# Patient Record
Sex: FEMALE | Race: WHITE | NOT HISPANIC OR LATINO | Employment: UNEMPLOYED | RURAL
[De-identification: names, ages, dates, MRNs, and addresses within clinical notes are randomized per-mention and may not be internally consistent; named-entity substitution may affect disease eponyms.]

---

## 2017-03-01 ENCOUNTER — OFFICE VISIT (OUTPATIENT)
Dept: NEUROLOGY | Facility: CLINIC | Age: 57
End: 2017-03-01

## 2017-03-01 VITALS
HEART RATE: 77 BPM | OXYGEN SATURATION: 98 % | BODY MASS INDEX: 35.88 KG/M2 | SYSTOLIC BLOOD PRESSURE: 138 MMHG | HEIGHT: 62 IN | WEIGHT: 195 LBS | DIASTOLIC BLOOD PRESSURE: 89 MMHG

## 2017-03-01 DIAGNOSIS — M54.12 CERVICAL RADICULOPATHY: ICD-10-CM

## 2017-03-01 DIAGNOSIS — G56.03 BILATERAL CARPAL TUNNEL SYNDROME: ICD-10-CM

## 2017-03-01 DIAGNOSIS — G25.0 ESSENTIAL TREMOR: ICD-10-CM

## 2017-03-01 DIAGNOSIS — M54.16 LUMBAR RADICULOPATHY: ICD-10-CM

## 2017-03-01 DIAGNOSIS — M19.011 ARTHROPATHY OF RIGHT SHOULDER: ICD-10-CM

## 2017-03-01 DIAGNOSIS — E11.42 DIABETIC PERIPHERAL NEUROPATHY (HCC): Primary | ICD-10-CM

## 2017-03-01 PROCEDURE — 99214 OFFICE O/P EST MOD 30 MIN: CPT | Performed by: PSYCHIATRY & NEUROLOGY

## 2017-03-01 RX ORDER — ROSUVASTATIN CALCIUM 10 MG/1
TABLET, COATED ORAL
COMMUNITY
Start: 2017-02-27 | End: 2018-08-02

## 2017-03-01 RX ORDER — DOXEPIN HYDROCHLORIDE 10 MG/1
20 CAPSULE ORAL NIGHTLY
Qty: 60 CAPSULE | Refills: 5 | Status: SHIPPED | OUTPATIENT
Start: 2017-03-01 | End: 2017-03-03 | Stop reason: SDUPTHER

## 2017-03-01 RX ORDER — FLUCONAZOLE 150 MG/1
TABLET ORAL
Refills: 0 | COMMUNITY
Start: 2017-02-14 | End: 2018-08-02

## 2017-03-01 RX ORDER — DICYCLOMINE HCL 20 MG
TABLET ORAL
COMMUNITY
Start: 2017-02-27 | End: 2019-12-17

## 2017-03-01 RX ORDER — PRAVASTATIN SODIUM 20 MG
TABLET ORAL
COMMUNITY
Start: 2017-02-27 | End: 2018-08-02

## 2017-03-01 RX ORDER — BUSPIRONE HYDROCHLORIDE 7.5 MG/1
TABLET ORAL
COMMUNITY
Start: 2017-02-13

## 2017-03-01 NOTE — PROGRESS NOTES
Subjective:     Patient ID: Jackelin Ramsey is a 56 y.o. female.    Neck Pain    This is a chronic problem. The current episode started more than 1 year ago. The problem occurs intermittently. The problem has been gradually worsening. Associated symptoms include headaches and numbness. Pertinent negatives include no chest pain, fever, photophobia or weakness.   Back Pain   This is a chronic problem. The current episode started more than 1 year ago. The problem occurs intermittently. The problem has been gradually worsening since onset. Associated symptoms include abdominal pain, headaches, numbness and pelvic pain. Pertinent negatives include no chest pain, dysuria, fever or weakness.   Upper Extremity Issue   This is a chronic problem. The current episode started more than 1 year ago. The problem occurs constantly. The problem has been unchanged. Associated symptoms include abdominal pain, fatigue, headaches, joint swelling (& STIFFNESS), nausea, numbness and a sore throat. Pertinent negatives include no arthralgias, chest pain, chills, coughing, fever, myalgias, neck pain, rash, vomiting or weakness.   Lower Extremity Issue   This is a chronic problem. The current episode started more than 1 year ago. The problem occurs constantly. The problem has been unchanged. Associated symptoms include abdominal pain, fatigue, headaches, joint swelling (& STIFFNESS), nausea, numbness and a sore throat. Pertinent negatives include no arthralgias, chest pain, chills, coughing, fever, myalgias, neck pain, rash, vomiting or weakness.   Muscle Pain   This is a chronic problem. The current episode started more than 1 year ago. The problem occurs constantly. The problem is unchanged. Associated symptoms include abdominal pain, constipation, diarrhea, eye pain, fatigue, headaches, joint swelling (& STIFFNESS), nausea, shortness of breath and wheezing. Pertinent negatives include no chest pain, dysuria, fever, rash, vomiting or  weakness.     The following portions of the patient's history were reviewed and updated as appropriate: allergies, current medications, past family history, past medical history, past social history, past surgical history and problem list.     Patient complains of persistent right shoulder and neck pain, not much better with PT, has intermittent right leg tremor. She had insomnia with prednisone. She has FMS, DM. Complains of leg/foot pain at night. She has had reactions to diabetic meds. Prednisone and meloxicam have helped some. She is intolerant of Lyrica. XRs showed DJD right shoulder, mild DJD c. spine.    Review of Systems   Constitutional: Positive for fatigue and unexpected weight change. Negative for chills and fever.   HENT: Positive for rhinorrhea and sore throat. Negative for ear pain, hearing loss and nosebleeds.    Eyes: Positive for pain and redness. Negative for photophobia, discharge, itching and visual disturbance.        DRY EYES   Respiratory: Positive for shortness of breath and wheezing. Negative for cough and chest tightness.    Cardiovascular: Negative for chest pain, palpitations and leg swelling.   Gastrointestinal: Positive for abdominal pain, constipation, diarrhea and nausea. Negative for blood in stool and vomiting.   Endocrine: Positive for heat intolerance.        MUSCLE WEAKNESS   Genitourinary: Positive for pelvic pain. Negative for dysuria, frequency, hematuria and urgency.   Musculoskeletal: Positive for joint swelling (& STIFFNESS). Negative for arthralgias, back pain, gait problem, myalgias, neck pain and neck stiffness.   Skin: Negative for rash and wound.        ITCHING, DRY SKIN   Allergic/Immunologic: Negative for environmental allergies and food allergies.   Neurological: Positive for numbness and headaches. Negative for dizziness, tremors, seizures, syncope, speech difficulty, weakness and light-headedness.        TINGLING, DIFFICULTY WALKING   Hematological: Negative for  adenopathy. Does not bruise/bleed easily.   Psychiatric/Behavioral: Negative for agitation, confusion, decreased concentration, hallucinations, sleep disturbance and suicidal ideas. The patient is nervous/anxious.         Objective:    Neurologic Exam     Mental Status   Oriented to person, place, and time.     Cranial Nerves     CN III, IV, VI   Pupils are equal, round, and reactive to light.  Extraocular motions are normal.     Motor Exam     Strength   Strength 5/5 throughout.       Physical Exam   Constitutional: She is oriented to person, place, and time. She appears well-developed and well-nourished.   HENT:   Head: Normocephalic and atraumatic.   Eyes: EOM are normal. Pupils are equal, round, and reactive to light.   Neck: Carotid bruit is not present.   Cardiovascular: Normal rate, regular rhythm, normal heart sounds and intact distal pulses.    Pulmonary/Chest: Effort normal and breath sounds normal.   Abdominal: Soft. Bowel sounds are normal.   Musculoskeletal:   Decreased ROM cervical and ls spine, antalgic gait, positive SLR. Decreased ROM right shoulder. Positive Tinel's at both wrists.   Neurological: She is alert and oriented to person, place, and time. She has normal strength. No cranial nerve deficit or sensory deficit. She displays a negative Romberg sign. Coordination and gait normal. She displays no Babinski's sign on the right side. She displays no Babinski's sign on the left side.   Hypoactive DTRs.   Skin: Skin is warm.   Psychiatric: She has a normal mood and affect. Her behavior is normal. Thought content normal. Cognition and memory are normal.       Assessment/Plan:     Jackelin was seen today for bone spurs.    Diagnoses and all orders for this visit:    Diabetic peripheral neuropathy  -     doxepin (SINEquan) 10 MG capsule; Take 2 capsules by mouth Every Night.    Bilateral carpal tunnel syndrome  -      Wrist Hand Orthosis, Wrist Extension Control Cock-up    Arthropathy of right  shoulder  -     Ambulatory Referral to Orthopedic Surgery    Cervical radiculopathy  -     MRI Cervical Spine Without Contrast    Lumbar radiculopathy  -     MRI Lumbar Spine Without Contrast    Essential tremor

## 2017-03-03 DIAGNOSIS — E11.42 DIABETIC PERIPHERAL NEUROPATHY (HCC): ICD-10-CM

## 2017-03-03 RX ORDER — DOXEPIN HYDROCHLORIDE 10 MG/1
20 CAPSULE ORAL NIGHTLY
Qty: 60 CAPSULE | Refills: 5 | Status: SHIPPED | OUTPATIENT
Start: 2017-03-03 | End: 2018-08-02

## 2017-03-07 DIAGNOSIS — M25.511 RIGHT SHOULDER PAIN, UNSPECIFIED CHRONICITY: Primary | ICD-10-CM

## 2017-03-08 ENCOUNTER — OFFICE VISIT (OUTPATIENT)
Dept: ORTHOPEDIC SURGERY | Facility: CLINIC | Age: 57
End: 2017-03-08

## 2017-03-08 VITALS — RESPIRATION RATE: 18 BRPM | WEIGHT: 200 LBS | HEIGHT: 62 IN | BODY MASS INDEX: 36.8 KG/M2

## 2017-03-08 DIAGNOSIS — M25.512 LEFT SHOULDER PAIN, UNSPECIFIED CHRONICITY: Primary | ICD-10-CM

## 2017-03-08 DIAGNOSIS — IMO0002 BURSITIS/TENDONITIS, SHOULDER: ICD-10-CM

## 2017-03-08 DIAGNOSIS — R20.0 BILATERAL HAND NUMBNESS: ICD-10-CM

## 2017-03-08 DIAGNOSIS — M25.511 BILATERAL SHOULDER PAIN, UNSPECIFIED CHRONICITY: ICD-10-CM

## 2017-03-08 DIAGNOSIS — M25.512 BILATERAL SHOULDER PAIN, UNSPECIFIED CHRONICITY: ICD-10-CM

## 2017-03-08 PROCEDURE — 99204 OFFICE O/P NEW MOD 45 MIN: CPT | Performed by: ORTHOPAEDIC SURGERY

## 2017-03-08 RX ORDER — BLOOD SUGAR DIAGNOSTIC
STRIP MISCELLANEOUS
COMMUNITY
Start: 2017-02-24

## 2017-03-08 RX ORDER — CANAGLIFLOZIN 100 MG/1
TABLET, FILM COATED ORAL
COMMUNITY
Start: 2017-02-27 | End: 2018-08-02

## 2017-03-08 RX ORDER — LANCING DEVICE
EACH MISCELLANEOUS
COMMUNITY
Start: 2017-02-24

## 2017-03-08 RX ORDER — SULFAMETHOXAZOLE AND TRIMETHOPRIM 800; 160 MG/1; MG/1
TABLET ORAL
COMMUNITY
Start: 2017-01-23 | End: 2018-08-02

## 2017-03-08 RX ORDER — ISOPROPYL ALCOHOL 0.7 ML/ML
SWAB TOPICAL
COMMUNITY
Start: 2016-12-23

## 2017-03-08 RX ORDER — GLIPIZIDE 10 MG/1
10 TABLET ORAL
Refills: 2 | COMMUNITY
Start: 2017-01-16

## 2017-03-08 NOTE — PROGRESS NOTES
Subjective   Patient ID: Jackelin Ramsey is a 56 y.o. female  Pain and Consult of the Right Shoulder (Patient is here today to be seen at the request of Dr. Farrar. Patient is right hand dominant. Patient also complains of neck and back pain. Patient denies injury.)         History of Present Illness  Patient complains of right worse than left shoulder pain for over 1 month no trauma, has been undergoing therapy with minimal improvement.  Has seen Dr. Mcelroy for chronic neck pain and rheumatoid arthritis.  Underwent a trial treatment with prednisone with minimal improvement in her shoulder symptoms.  Does complain of bilateral hand numbness and tingling dropping things frequently does repetitive work as a .      Review of Systems   Constitutional: Negative for diaphoresis, fever and unexpected weight change.   HENT: Negative for dental problem and sore throat.    Eyes: Negative for visual disturbance.   Respiratory: Negative for shortness of breath.    Cardiovascular: Negative for chest pain.   Gastrointestinal: Negative for abdominal pain, constipation, diarrhea, nausea and vomiting.   Genitourinary: Negative for difficulty urinating and frequency.   Musculoskeletal: Positive for arthralgias.   Neurological: Negative for headaches.   Hematological: Does not bruise/bleed easily.   All other systems reviewed and are negative.      Past Medical History   Diagnosis Date   • Anxiety    • Arthritis    • Asthma    • Bursitis    • Cancer    • Cervical disc disorder    • CTS (carpal tunnel syndrome)    • Diabetes    • Diabetes mellitus    • Diverticulosis    • GERD (gastroesophageal reflux disease)    • Hyperlipidemia    • Hypertension    • Lumbosacral disc disease         Past Surgical History   Procedure Laterality Date   • Dilatation and curettage     • Bladder surgery     • Hysterectomy         Family History   Problem Relation Age of Onset   • Cancer Mother    • Heart disease Mother    • Hypertension Mother   "  • Diabetes Father    • Heart disease Father    • Hypertension Father    • Parkinsonism Maternal Aunt    • Rheum arthritis Other    • Ulcers Other    • Hyperlipidemia Other        Social History     Social History   • Marital status:      Spouse name: N/A   • Number of children: N/A   • Years of education: N/A     Occupational History   • Not on file.     Social History Main Topics   • Smoking status: Never Smoker   • Smokeless tobacco: Not on file   • Alcohol use No   • Drug use: No   • Sexual activity: Defer     Other Topics Concern   • Not on file     Social History Narrative       Allergies   Allergen Reactions   • Farxiga [Dapagliflozin]    • Penicillins    • Percocet [Oxycodone-Acetaminophen]    • Tetracyclines & Related        Objective   Visit Vitals   • Resp 18   • Ht 62\" (157.5 cm)   • Wt 200 lb (90.7 kg)   • BMI 36.58 kg/m2      Physical Exam  Constitutional: He is oriented to person, place, and time. He appears well-developed and well-nourished.   HENT:   Head: Normocephalic and atraumatic.   Eyes: EOM are normal. Pupils are equal, round, and reactive to light.   Neck: Normal range of motion. Neck supple.   Cardiovascular: Normal rate.    Pulmonary/Chest: Effort normal and breath sounds normal.   Abdominal: Soft.   Neurological: He is alert and oriented to person, place, and time.   Skin: Skin is warm and dry.   Psychiatric: He has a normal mood and affect.   Nursing note and vitals reviewed.       Ortho Exam   Right shoulder: Forward flexion 130, abduction 140, internal rotation 30, external rotation 20    Left shoulder: Forward flexion 140, 130 internal rotation, 40 external rotation 30 internal rotation    Assessment/Plan   Review of Radiographic Studies:    Radiographic images today of affected area I personally viewed and showed no sign of acute fracture or dislocation.      Procedures        Physical therapy referral given      Recommendations/Plan:   Work/Activity Status: May perform usual " activities as tolerated      Patient agreeable to call or return sooner for any concerns.             Impression:  Probable bilateral hand carpal tunnel syndrome, bilateral shoulder subacromial bursitis  Plan:  Continue physical therapy for the shoulder regions, formal nerve conduction studies both hands to confirm carpal tunnel syndrome, if not improved next visit consider subacromial steroid injections to both shoulders, discuss surgical release of carpal tunnels when EMG studies complete

## 2017-04-18 ENCOUNTER — PROCEDURE VISIT (OUTPATIENT)
Dept: ORTHOPEDIC SURGERY | Facility: CLINIC | Age: 57
End: 2017-04-18

## 2017-04-18 DIAGNOSIS — G56.03 CARPAL TUNNEL SYNDROME, BILATERAL: ICD-10-CM

## 2017-04-18 DIAGNOSIS — R20.2 PARESTHESIA: ICD-10-CM

## 2017-04-18 PROCEDURE — 95911 NRV CNDJ TEST 9-10 STUDIES: CPT | Performed by: PHYSICAL THERAPIST

## 2017-04-18 PROCEDURE — 95886 MUSC TEST DONE W/N TEST COMP: CPT | Performed by: PHYSICAL THERAPIST

## 2017-04-27 ENCOUNTER — OFFICE VISIT (OUTPATIENT)
Dept: ORTHOPEDIC SURGERY | Facility: CLINIC | Age: 57
End: 2017-04-27

## 2017-04-27 VITALS — WEIGHT: 199.9 LBS | RESPIRATION RATE: 18 BRPM | HEIGHT: 62 IN | BODY MASS INDEX: 36.78 KG/M2

## 2017-04-27 DIAGNOSIS — IMO0002 BURSITIS/TENDONITIS, SHOULDER: Primary | ICD-10-CM

## 2017-04-27 PROCEDURE — 99213 OFFICE O/P EST LOW 20 MIN: CPT | Performed by: ORTHOPAEDIC SURGERY

## 2017-04-27 PROCEDURE — 20610 DRAIN/INJ JOINT/BURSA W/O US: CPT | Performed by: ORTHOPAEDIC SURGERY

## 2017-04-27 RX ORDER — FLUTICASONE PROPIONATE 50 MCG
2 SPRAY, SUSPENSION (ML) NASAL DAILY
Refills: 0 | COMMUNITY
Start: 2017-03-24

## 2017-04-27 RX ORDER — DULAGLUTIDE 0.75 MG/.5ML
INJECTION, SOLUTION SUBCUTANEOUS
Refills: 2 | COMMUNITY
Start: 2017-04-13 | End: 2019-12-17

## 2017-04-27 RX ORDER — METHYLPREDNISOLONE ACETATE 40 MG/ML
40 INJECTION, SUSPENSION INTRA-ARTICULAR; INTRALESIONAL; INTRAMUSCULAR; SOFT TISSUE
Status: COMPLETED | OUTPATIENT
Start: 2017-04-27 | End: 2017-04-27

## 2017-04-27 RX ORDER — LIDOCAINE HYDROCHLORIDE 10 MG/ML
2 INJECTION, SOLUTION INFILTRATION; PERINEURAL
Status: COMPLETED | OUTPATIENT
Start: 2017-04-27 | End: 2017-04-27

## 2017-04-27 RX ORDER — MELOXICAM 15 MG/1
TABLET ORAL
Refills: 0 | COMMUNITY
Start: 2017-03-28 | End: 2018-08-02

## 2017-04-27 RX ADMIN — METHYLPREDNISOLONE ACETATE 40 MG: 40 INJECTION, SUSPENSION INTRA-ARTICULAR; INTRALESIONAL; INTRAMUSCULAR; SOFT TISSUE at 13:27

## 2017-04-27 RX ADMIN — LIDOCAINE HYDROCHLORIDE 2 ML: 10 INJECTION, SOLUTION INFILTRATION; PERINEURAL at 13:27

## 2017-04-27 NOTE — PROGRESS NOTES
Subjective   Patient ID: Jackelin Ramsey is a 56 y.o. female  Pain, Follow-up, and Results of the Right Shoulder (EMG results )             History of Present Illness    EMG studies both upper arms entirely normal including the paracervical musculature, she still complains of anterior right shoulder pain with stiffness especially when reaching up and behind her with her right dominant arm.  She is bothering chronic fibromyalgia rheumatoid arthritis and does take chronic medications for those 2 conditions.  New-onset paresthesias neck pain    Review of Systems   Constitutional: Negative for diaphoresis, fever and unexpected weight change.   HENT: Negative for dental problem and sore throat.    Eyes: Negative for visual disturbance.   Respiratory: Negative for shortness of breath.    Cardiovascular: Negative for chest pain.   Gastrointestinal: Negative for abdominal pain, constipation, diarrhea, nausea and vomiting.   Genitourinary: Negative for difficulty urinating and frequency.   Musculoskeletal: Positive for arthralgias.   Neurological: Negative for headaches.   Hematological: Does not bruise/bleed easily.   All other systems reviewed and are negative.      Past Medical History:   Diagnosis Date   • Anxiety    • Arthritis    • Asthma    • Bursitis    • Cancer    • Cervical disc disorder    • CTS (carpal tunnel syndrome)    • Diabetes    • Diabetes mellitus    • Diverticulosis    • GERD (gastroesophageal reflux disease)    • Hyperlipidemia    • Hypertension    • Lumbosacral disc disease         Past Surgical History:   Procedure Laterality Date   • BLADDER SURGERY     • DILATATION AND CURETTAGE     • HYSTERECTOMY         Family History   Problem Relation Age of Onset   • Cancer Mother    • Heart disease Mother    • Hypertension Mother    • Diabetes Father    • Heart disease Father    • Hypertension Father    • Parkinsonism Maternal Aunt    • Rheum arthritis Other    • Ulcers Other    • Hyperlipidemia Other   "      Social History     Social History   • Marital status:      Spouse name: N/A   • Number of children: N/A   • Years of education: N/A     Occupational History   •       [employer]     Social History Main Topics   • Smoking status: Never Smoker   • Smokeless tobacco: Not on file   • Alcohol use No   • Drug use: No   • Sexual activity: Defer     Other Topics Concern   • Not on file     Social History Narrative       Allergies   Allergen Reactions   • Farxiga [Dapagliflozin]    • Penicillins    • Percocet [Oxycodone-Acetaminophen]    • Tetracyclines & Related        Objective   Resp 18  Ht 62\" (157.5 cm)  Wt 199 lb 14.4 oz (90.7 kg)  BMI 36.56 kg/m2   Physical Exam  Constitutional: He is oriented to person, place, and time. He appears well-developed and well-nourished.   HENT:   Head: Normocephalic and atraumatic.   Eyes: EOM are normal. Pupils are equal, round, and reactive to light.   Neck: Normal range of motion. Neck supple.   Cardiovascular: Normal rate.    Pulmonary/Chest: Effort normal and breath sounds normal.   Abdominal: Soft.   Neurological: He is alert and oriented to person, place, and time.   Skin: Skin is warm and dry.   Psychiatric: He has a normal mood and affect.   Nursing note and vitals reviewed.       Ortho Exam   Right shoulder with positive loss of passive external rotation and positive loss of passive abduction with pain anterolaterally at the right shoulder otherwise preserved strength negative tenderness at the acromial clavicle joint or biceps tendon anteriorly.  Cervical motion limited but does not elicit right posterior shoulder pain    Assessment/Plan   Review of Radiographic Studies:    No new imaging done today.      Large Joint Arthrocentesis  Date/Time: 4/27/2017 1:27 PM  Consent given by: patient  Site marked: site marked  Timeout: Immediately prior to procedure a time out was called to verify the correct patient, procedure, equipment, " support staff and site/side marked as required   Supporting Documentation  Indications: pain   Procedure Details  Location: shoulder - R subacromial bursa  Preparation: Patient was prepped and draped in the usual sterile fashion  Needle size: 22 G  Medications administered: 40 mg methylPREDNISolone acetate 40 MG/ML; 2 mL lidocaine 1 %  Patient tolerance: patient tolerated the procedure well with no immediate complications              Physical therapy referral given      Recommendations/Plan:   Referral: PT/OT 2-3 x wk x 4-6 wks      Patient agreeable to call or return sooner for any concerns.             Impression:  Subacromial bursitis right dominant shoulder with history of fibromyalgia and rheumatoid arthritis recent normal bilateral upper extremity normal nerve conduction studies  Plan:  Refer to therapy for an impingement protocol right shoulder bursitis tendinitis recheck 1 month if not improved consider MRI right shoulder at that time

## 2018-02-08 ENCOUNTER — OFFICE VISIT (OUTPATIENT)
Dept: NEUROLOGY | Facility: CLINIC | Age: 58
End: 2018-02-08

## 2018-02-08 VITALS
BODY MASS INDEX: 35.88 KG/M2 | DIASTOLIC BLOOD PRESSURE: 82 MMHG | SYSTOLIC BLOOD PRESSURE: 125 MMHG | WEIGHT: 195 LBS | HEIGHT: 62 IN

## 2018-02-08 DIAGNOSIS — M79.7 FIBROMYALGIA: ICD-10-CM

## 2018-02-08 DIAGNOSIS — G25.0 ESSENTIAL TREMOR: Primary | ICD-10-CM

## 2018-02-08 DIAGNOSIS — E11.42 DIABETIC PERIPHERAL NEUROPATHY (HCC): ICD-10-CM

## 2018-02-08 PROCEDURE — 99214 OFFICE O/P EST MOD 30 MIN: CPT | Performed by: PSYCHIATRY & NEUROLOGY

## 2018-02-08 NOTE — PROGRESS NOTES
Subjective:     Patient ID: Jackelin Ramsey is a 57 y.o. female.    CC:   Chief Complaint   Patient presents with   • Parkinson's Disease   • Peripheral Neuropathy       HPI:   History of Present Illness  The following portions of the patient's history were reviewed and updated as appropriate: allergies, current medications, past family history, past medical history, past social history, past surgical history and problem list.     Patient has had a shoulder injection with some improvement on gabapentin. Leg pain,neck, low back stable. EMG/NCVs last year of arms were normal. She never completed MRI studies of the spine. She reports a slight hand tremor, generally not disabling.    Past Medical History:   Diagnosis Date   • Anxiety    • Arthritis    • Asthma    • Bursitis    • Cancer    • Cervical disc disorder    • CTS (carpal tunnel syndrome)    • Diabetes    • Diabetes mellitus    • Diverticulosis    • GERD (gastroesophageal reflux disease)    • Hyperlipidemia    • Hypertension    • Lumbosacral disc disease        Past Surgical History:   Procedure Laterality Date   • BLADDER SURGERY     • DILATATION AND CURETTAGE     • HYSTERECTOMY         Social History     Social History   • Marital status:      Spouse name: N/A   • Number of children: N/A   • Years of education: N/A     Occupational History   • [employer]     Social History Main Topics   • Smoking status: Never Smoker   • Smokeless tobacco: Not on file   • Alcohol use No   • Drug use: No   • Sexual activity: Defer     Other Topics Concern   • Not on file     Social History Narrative       Family History   Problem Relation Age of Onset   • Cancer Mother    • Heart disease Mother    • Hypertension Mother    • Diabetes Father    • Heart disease Father    • Hypertension Father    • Parkinsonism Maternal Aunt    • Rheum arthritis Other    • Ulcers Other    • Hyperlipidemia Other         Review of Systems   Constitutional:  Positive for activity change and fatigue. Negative for chills, fever and unexpected weight change.   HENT: Negative for ear pain, hearing loss, nosebleeds, rhinorrhea and sore throat.    Eyes: Positive for photophobia. Negative for pain, discharge, itching and visual disturbance.   Respiratory: Positive for shortness of breath and wheezing. Negative for cough and chest tightness.    Cardiovascular: Negative for chest pain, palpitations and leg swelling.   Gastrointestinal: Positive for abdominal pain, constipation and diarrhea. Negative for blood in stool, nausea and vomiting.   Endocrine: Positive for heat intolerance and polyphagia.   Genitourinary: Negative for dysuria, frequency, hematuria and urgency.   Musculoskeletal: Positive for arthralgias, back pain, joint swelling, myalgias, neck pain and neck stiffness. Negative for gait problem.   Skin: Negative for rash and wound.   Allergic/Immunologic: Negative for environmental allergies and food allergies.   Neurological: Positive for dizziness, tremors, numbness and headaches. Negative for seizures, syncope, speech difficulty, weakness and light-headedness.   Hematological: Negative for adenopathy. Does not bruise/bleed easily.   Psychiatric/Behavioral: Negative for agitation, confusion, decreased concentration, hallucinations, sleep disturbance and suicidal ideas. The patient is nervous/anxious.         Objective:    Neurologic Exam     Mental Status   Oriented to person, place, and time.       Physical Exam   Constitutional: She is oriented to person, place, and time. She appears well-developed and well-nourished.   Cardiovascular: Normal rate and regular rhythm.    Pulmonary/Chest: Effort normal.   Neurological: She is alert and oriented to person, place, and time.   Mild kinetic hand tremor, DTRs hypoactive.   Psychiatric: She has a normal mood and affect. Her behavior is normal. Thought content normal.       Assessment/Plan:       Jackelin was seen today for  parkinson's disease and peripheral neuropathy.    Diagnoses and all orders for this visit:    Essential tremor     -  Avoid caffeine, hypoglycemia.  Diabetic peripheral neuropathy     -  Continue gabapentin.  Fibromyalgia     -  Continue gabapentin.         Surinder Farrar MD  2/19/2018

## 2018-08-02 ENCOUNTER — OFFICE VISIT (OUTPATIENT)
Dept: NEUROLOGY | Facility: CLINIC | Age: 58
End: 2018-08-02

## 2018-08-02 ENCOUNTER — LAB REQUISITION (OUTPATIENT)
Dept: LAB | Facility: HOSPITAL | Age: 58
End: 2018-08-02

## 2018-08-02 VITALS
BODY MASS INDEX: 34.75 KG/M2 | SYSTOLIC BLOOD PRESSURE: 128 MMHG | WEIGHT: 190 LBS | DIASTOLIC BLOOD PRESSURE: 70 MMHG | OXYGEN SATURATION: 99 % | HEART RATE: 67 BPM

## 2018-08-02 DIAGNOSIS — R20.2 FACIAL PARESTHESIA: ICD-10-CM

## 2018-08-02 DIAGNOSIS — G25.0 ESSENTIAL TREMOR: ICD-10-CM

## 2018-08-02 DIAGNOSIS — M79.7 FIBROMYALGIA: ICD-10-CM

## 2018-08-02 DIAGNOSIS — M54.12 CERVICAL RADICULOPATHY: ICD-10-CM

## 2018-08-02 DIAGNOSIS — R25.8 JERKY BODY MOVEMENTS: ICD-10-CM

## 2018-08-02 DIAGNOSIS — Z00.00 ROUTINE GENERAL MEDICAL EXAMINATION AT A HEALTH CARE FACILITY: ICD-10-CM

## 2018-08-02 DIAGNOSIS — E11.42 DIABETIC PERIPHERAL NEUROPATHY (HCC): Primary | ICD-10-CM

## 2018-08-02 DIAGNOSIS — Z79.899 ENCOUNTER FOR DRUG THERAPY: ICD-10-CM

## 2018-08-02 PROCEDURE — 99214 OFFICE O/P EST MOD 30 MIN: CPT | Performed by: NURSE PRACTITIONER

## 2018-08-02 PROCEDURE — 36415 COLL VENOUS BLD VENIPUNCTURE: CPT | Performed by: NURSE PRACTITIONER

## 2018-08-02 RX ORDER — CETIRIZINE HYDROCHLORIDE 10 MG/1
TABLET, FILM COATED ORAL
Refills: 5 | COMMUNITY
Start: 2018-07-13

## 2018-08-02 RX ORDER — PAROXETINE HYDROCHLORIDE 40 MG/1
TABLET, FILM COATED ORAL
Refills: 2 | COMMUNITY
Start: 2018-07-31 | End: 2019-12-17

## 2018-08-02 RX ORDER — ROSUVASTATIN CALCIUM 5 MG/1
TABLET, COATED ORAL
Refills: 2 | COMMUNITY
Start: 2018-07-14 | End: 2023-04-06

## 2018-08-02 RX ORDER — MONTELUKAST SODIUM 10 MG/1
10 TABLET ORAL NIGHTLY
Refills: 5 | COMMUNITY
Start: 2018-07-25

## 2018-08-02 RX ORDER — ALOGLIPTIN 25 MG/1
TABLET, FILM COATED ORAL
COMMUNITY
Start: 2018-07-14 | End: 2019-12-17

## 2018-08-02 RX ORDER — VENLAFAXINE HYDROCHLORIDE 75 MG/1
75 CAPSULE, EXTENDED RELEASE ORAL DAILY
Refills: 2 | COMMUNITY
Start: 2018-07-14

## 2018-08-02 RX ORDER — LOSARTAN POTASSIUM 50 MG/1
TABLET ORAL
Refills: 2 | COMMUNITY
Start: 2018-05-25

## 2018-08-02 NOTE — PROGRESS NOTES
"Subjective:     Patient ID: Jackelin Ramsey is a 57 y.o. female.    CC:   Chief Complaint   Patient presents with   • Gait Problem   • Tremors   • Dizziness   • Numbness       HPI:   History of Present Illness   This is a 58 yo female who presents for 6 month follow up on Diabetic Peripheral Neuropathy present for many years. She normally sees Dr. Farrar Neurology. She is currently prescribed gabapentin 400mg BID and she tells me most days she will take this daily but sometimes she will take it BID. She tells me she tries to minimize her usage d/t being on multiple medications. She is intolerant to Lyrica. Gabapentin has helped moderately with the numbness and tingling in BLE. Also has paresthesias in hands/arms and EMG/NCVS 2017 was normal of BUE. She does need refills on Gabapentin. Also has Fibromyalgia and taking gabapentin for this as well.    She also has Essential Tremors of BUE and occasionally a leg.  She tells me her tremors have been worse and she now has some jerking in her lip and face and also gets some numbness and tingling in her face.  She tells me that her mouth will shake as well.  Her tremor is worse when she is writing or holding an object.  She feels that she shakes most of the time.  Her daughter also has an essential tremor.  She did have a sister who did have Parkinson's disease.  She does have issues with swallowing that her sister and her brother have also had this issue and had to have the esophagus stretched.  She is actually seeing GI Dr. Hernandez is going to have an EGD and a colonoscopy and they're going to evaluate for her needing her esophagus stretched as well.  No issues with smelling. Reports getting labs every 3 months with PCP and has been getting B12 injections. Her daughter is with her and tells me she will be riding in the car as the passenger and her mom's face will twitch and jerk and \"draw up\" and no history of strokes or TIAs and no increased stress or other known " causes. Denies LOC, denies confusion, denies other body jerking, denies urinary or bowel incontinence or biting tongue. It will resolve on its own after several seconds. Is on aspirin and Crestor.    She does have issues with gait but this is related to chronic lower back pain.  She also did have a fall recently where she did slip on wet floor.  She does have chronic neck pain with prior x-ray showing degenerative joint disease and has not improved with physical therapy previously.  She was ordered to have an MRI of the cervical spine in the past and never completed this test.  She feels like she gets radiating pains from her neck into both arms with numbness and tingling and would be willing to complete the MRI.    The following portions of the patient's history were reviewed and updated as appropriate: allergies, current medications, past family history, past medical history, past social history, past surgical history and problem list.    Past Medical History:   Diagnosis Date   • Anxiety    • Arthritis    • Asthma    • Bursitis    • Cancer (CMS/[facility])    • Cervical disc disorder    • CTS (carpal tunnel syndrome)    • Diabetes (CMS/[facility])    • Diabetes mellitus (CMS/[facility])    • Diverticulosis    • GERD (gastroesophageal reflux disease)    • Hyperlipidemia    • Hypertension    • Lumbosacral disc disease        Past Surgical History:   Procedure Laterality Date   • BLADDER SURGERY     • DILATATION AND CURETTAGE     • HYSTERECTOMY         Social History     Social History   • Marital status:      Spouse name: N/A   • Number of children: N/A   • Years of education: N/A     Occupational History   •       [employer]     Social History Main Topics   • Smoking status: Never Smoker   • Smokeless tobacco: Not on file   • Alcohol use No   • Drug use: No   • Sexual activity: Defer     Other Topics Concern   • Not on file     Social History Narrative   • No narrative on file       Family History    Problem Relation Age of Onset   • Cancer Mother    • Heart disease Mother    • Hypertension Mother    • Diabetes Father    • Heart disease Father    • Hypertension Father    • Parkinsonism Maternal Aunt    • Rheum arthritis Other    • Ulcers Other    • Hyperlipidemia Other         Review of Systems   Constitutional: Positive for fatigue. Negative for chills, fever and unexpected weight change.   HENT: Positive for hearing loss, sore throat and voice change. Negative for ear pain, nosebleeds and rhinorrhea.    Eyes: Negative for photophobia, pain, discharge, itching and visual disturbance.   Respiratory: Positive for shortness of breath and wheezing. Negative for cough and chest tightness.    Cardiovascular: Negative for chest pain, palpitations and leg swelling.   Gastrointestinal: Positive for abdominal pain, constipation and diarrhea. Negative for blood in stool, nausea and vomiting.   Genitourinary: Negative for dysuria, frequency, hematuria and urgency.   Musculoskeletal: Positive for arthralgias, back pain, gait problem, myalgias, neck pain and neck stiffness. Negative for joint swelling.   Skin: Positive for rash. Negative for wound.   Allergic/Immunologic: Negative for environmental allergies and food allergies.   Neurological: Positive for dizziness, weakness and numbness. Negative for tremors, seizures, syncope, speech difficulty, light-headedness and headaches.   Hematological: Negative for adenopathy. Does not bruise/bleed easily.   Psychiatric/Behavioral: Positive for sleep disturbance. Negative for agitation, confusion, decreased concentration, hallucinations and suicidal ideas. The patient is nervous/anxious.    All other systems reviewed and are negative.       Objective:    Neurologic Exam     Mental Status   Oriented to person, place, and time.   Speech: speech is normal   Level of consciousness: alert    Cranial Nerves   Cranial nerves II through XII intact.     Motor Exam   Muscle bulk:  normal  Overall muscle tone: normal    Strength   Strength 5/5 except as noted.   Right neck flexion: 4/5  Left neck flexion: 4/5  Right neck extension: 4/5  Left neck extension: 4/5    Sensory Exam   Right arm light touch: decreased from wrist  Left arm light touch: decreased from wrist  Right leg light touch: decreased from ankle  Left leg light touch: decreased from ankle  Right arm vibration: decreased from wrist  Left arm vibration: decreased from wrist  Right leg vibration: decreased from ankle  Left leg vibration: decreased from ankle  Right arm pinprick: decreased from wrist  Left arm pinprick: decreased from wrist  Right leg pinprick: decreased from ankle  Left leg pinprick: decreased from ankle    Gait, Coordination, and Reflexes     Gait  Gait: (antalgic, mild decreased arm swing but has chronic shoulder pain/DJD)    Coordination   Finger to nose coordination: normal    Tremor   Resting tremor: absent  Intention tremor: present (mild BUE kinetic tremor)  Action tremor: absent    Reflexes   Right brachioradialis: 3+  Left brachioradialis: 3+  Right biceps: 3+  Left biceps: 3+  Right triceps: 3+  Left triceps: 3+  Right patellar: 2+  Left patellar: 2+  Right achilles: 2+  Left achilles: 2+  Right : 2+  Left : 2+Normal finger tapping, normal heel tapping bilaterally, able to rise without assistance, no shuffling gait noted.       Physical Exam   Constitutional: She is oriented to person, place, and time.   Neck: Spinous process tenderness and muscular tenderness present. No neck rigidity. Decreased range of motion present. No edema and no erythema present.   Neurological: She is oriented to person, place, and time. She has a normal Finger-Nose-Finger Test.   Reflex Scores:       Tricep reflexes are 3+ on the right side and 3+ on the left side.       Bicep reflexes are 3+ on the right side and 3+ on the left side.       Brachioradialis reflexes are 3+ on the right side and 3+ on the left side.        Patellar reflexes are 2+ on the right side and 2+ on the left side.       Achilles reflexes are 2+ on the right side and 2+ on the left side.  Psychiatric: Her speech is normal and behavior is normal. Judgment and thought content normal. Her mood appears anxious. Cognition and memory are normal.       Assessment/Plan:       Jackelin was seen today for gait problem, tremors, dizziness and numbness.    Diagnoses and all orders for this visit:    Diabetic peripheral neuropathy (CMS/HCC)  -     Drug Screen 11 w/Conf, Serum  -     Gabapentin Level    Essential tremor  Comments:  Gabapentin 400mg BID will be sent to pharmacy if drug screen appropriate. Would avoid additional meds for now d/t polypharmacy.    Cervical radiculopathy  -     MRI Cervical Spine Without Contrast    Jerky body movements  -     MRI Brain Without Contrast  -     EEG Awake or Drowsy Routine    Facial paresthesia  -     MRI Brain Without Contrast    Encounter for drug therapy  -     Drug Screen 11 w/Conf, Serum  -     Gabapentin Level    Fibromyalgia  Comments:  Gabapentin 400mg BID will be sent to pharmacy if drug screen appropriate.         MRI of the cervical spine is requested to rule out cervical spinal stenosis with cervical radiculopathy as well as hyperreflexia on exam.  MRI of the brain without contrast is requested to rule out brain lesion, stroke or TIA with intermittent facial paresthesias as well as jerking body movements. EEG to r/o seizures. Will call with results. F/U in 6 months or sooner if needed. Reviewed medications, potential side effects and signs and symptoms to report. Discussed risk versus benefits of treatment plan with patient and/or family-including medications, labs and radiology that may be ordered. Addressed questions and concerns during visit. Patient and/or family verbalized understanding and agree with plan.    During this visit the following were done:  Labs Reviewed []    Labs Ordered []    Radiology Reports  Reviewed []    Radiology Ordered [x]    PCP Records Reviewed []    Referring Provider Records Reviewed []    ER Records Reviewed []    Hospital Records Reviewed []    History Obtained From Family []    Radiology Images Reviewed []    Other Reviewed []    Records Requested []      As part of this patient's treatment plan I am prescribing controlled substances. The patient has been made aware of appropriate use of such medications, including potential risk of somnolence, limited ability to drive and/or work safely, and potential for dependence or overdose. It has also been made clear that these medications are for use by the patient only, without concomitant use of alcohol or other substances unless prescribed. Keep out of reach of children.  Eliezer report has been reviewed. If this is going to be prescribed long term, [facility] Controlled Substance Agreement Contract has also been read and signed by patient and myself.  Eliezer #[id number] reviewed.  Gabapentin last filled 7/2/18 #60.    EMR Dragon/Transcription Disclaimer:  Much of this encounter note is an electronic transcription of spoken language to printed text. Electronic transcription of spoken language may permit erroneous words or phrases to be inadvertently transcribed. Although I have reviewed the note for such errors, some may still exist in this documentation.      Mamta Joshi, APRN  8/2/2018

## 2018-08-06 LAB
AMPHETAMINES SERPL QL SCN: NEGATIVE NG/ML
BARBITURATES SERPL QL SCN: NEGATIVE UG/ML
BENZODIAZ SERPL QL SCN: NEGATIVE NG/ML
CANNABINOIDS SERPL QL SCN: NEGATIVE NG/ML
COCAINE+BZE SERPL QL SCN: NEGATIVE NG/ML
ETHANOL UR-MCNC: NEGATIVE GM/DL
GABAPENTIN SERPLBLD-MCNC: NORMAL UG/ML (ref 4–16)
METHADONE SERPL QL SCN: NEGATIVE NG/ML
OPIATES SERPL QL SCN: NEGATIVE NG/ML
OXYCODONE+OXYMORPHONE SERPLBLD QL SCN: NEGATIVE NG/ML
PCP SERPL QL SCN: NEGATIVE NG/ML
PROPOXYPH SERPL QL SCN: NEGATIVE NG/ML

## 2018-08-07 ENCOUNTER — TELEPHONE (OUTPATIENT)
Dept: NEUROLOGY | Facility: CLINIC | Age: 58
End: 2018-08-07

## 2018-08-07 NOTE — TELEPHONE ENCOUNTER
----- Message from Frannie Mak sent at 8/6/2018 10:00 AM EDT -----  Regarding: DR. ATKINS/MEDICATION  PATIENT'S DAUGHTER CALLED, AND WAS CHECKING ON HER MOTHERS LAB, AND IF HER MEDICATION IS GOING TO BE CALLED IN.

## 2018-08-08 ENCOUNTER — TELEPHONE (OUTPATIENT)
Dept: NEUROLOGY | Facility: CLINIC | Age: 58
End: 2018-08-08

## 2018-08-08 NOTE — TELEPHONE ENCOUNTER
----- Message from PIERRE Zee sent at 8/8/2018  2:10 PM EDT -----  Drug screen and gabapentin level are negative but patient was out of gabapentin at follow up appointment on 8/2/18. Is Dr. Farrar ok with approving refills for her? Thanks, PIERRE Wilcox

## 2018-08-08 NOTE — PROGRESS NOTES
Drug screen and gabapentin level are negative but patient was out of gabapentin at follow up appointment on 8/2/18. Is Dr. Farrar ok with approving refills for her? Thanks, PIERRE Wilcox

## 2018-08-20 ENCOUNTER — APPOINTMENT (OUTPATIENT)
Dept: NEUROLOGY | Facility: HOSPITAL | Age: 58
End: 2018-08-20

## 2018-08-20 ENCOUNTER — HOSPITAL ENCOUNTER (OUTPATIENT)
Dept: NEUROLOGY | Facility: HOSPITAL | Age: 58
Discharge: HOME | End: 2018-08-20

## 2018-08-20 ENCOUNTER — TELEPHONE (OUTPATIENT)
Dept: NEUROLOGY | Facility: CLINIC | Age: 58
End: 2018-08-20

## 2018-08-20 ENCOUNTER — HOSPITAL ENCOUNTER (OUTPATIENT)
Dept: MRI IMAGING | Facility: HOSPITAL | Age: 58
Discharge: HOME | End: 2018-08-20
Admitting: NURSE PRACTITIONER

## 2018-08-20 ENCOUNTER — HOSPITAL ENCOUNTER (OUTPATIENT)
Dept: MRI IMAGING | Facility: HOSPITAL | Age: 58
Discharge: HOME | End: 2018-08-20

## 2018-08-20 PROCEDURE — 72141 MRI NECK SPINE W/O DYE: CPT

## 2018-08-20 PROCEDURE — 70551 MRI BRAIN STEM W/O DYE: CPT

## 2018-08-20 PROCEDURE — 95819 EEG AWAKE AND ASLEEP: CPT

## 2018-08-20 NOTE — PROGRESS NOTES
Please notify patient MRI Brain appears normal for her age and medical history. She should continue her aspirin and cholesterol medication daily. Her cervical spine does show significant bulging disc. I am inquiring if she would like a referral to pain management for injections to help with her neck pain and radiating symptoms into her arms. If she does I will place a referral. If she does not improve with pain management we could refer her to neurosurgery. Thanks, PIERRE Wilcox

## 2018-08-20 NOTE — TELEPHONE ENCOUNTER
----- Message from PIERRE Zee sent at 8/20/2018 12:55 PM EDT -----  Please notify patient MRI Brain appears normal for her age and medical history. She should continue her aspirin and cholesterol medication daily. Her cervical spine does show significant bulging disc. I am inquiring if she would like a referral to pain management for injections to help with her neck pain and radiating symptoms into her arms. If she does I will place a referral. If she does not improve with pain management we could refer her to neurosurgery. Thanks, PIRERE Wilcox

## 2018-08-20 NOTE — TELEPHONE ENCOUNTER
Pt is aware of her EEG results and her options for sleep study/seizure specialist.  She acknowledged understanding.

## 2018-08-20 NOTE — PROGRESS NOTES
Please notify patient MRI Brain appears normal for her age and medical history. She should continue her aspirin and cholesterol medication daily. Her cervical spine does show significant bulging disc. I am inquiring if she would like a referral to pain management for injections to help with her neck pain and radiating symptoms into her arms. If she does I will place a referral. If she does not improve with pain management we could refer her to neurosurgery. Thanks, PIERRE Wilcox    FAX MRI BRAIN AND C SPINE TO PCP FOR THEIR REVIEW.

## 2018-08-20 NOTE — TELEPHONE ENCOUNTER
----- Message from PIERRE Zee sent at 8/20/2018  9:42 AM EDT -----  Please notify patient EEG showed no evidence of seizure activity. However, if she continues to have confusion or staring type spells it would be worth referring her to a sleep specialist or seizure specialist for further workup. She can follow up with Dr. Farrar as scheduled. Thanks, PIERRE Wilcox

## 2018-08-20 NOTE — PROGRESS NOTES
Please notify patient EEG showed no evidence of seizure activity. However, if she continues to have confusion or staring type spells it would be worth referring her to a sleep specialist or seizure specialist for further workup. She can follow up with Dr. Farrar as scheduled. Thanks, PIERRE Wilcox

## 2018-08-21 ENCOUNTER — TELEPHONE (OUTPATIENT)
Dept: NEUROLOGY | Facility: CLINIC | Age: 58
End: 2018-08-21

## 2018-08-21 NOTE — TELEPHONE ENCOUNTER
I spoke with pt letting her know the results of her MRI/Cervical were normal except some bulging discs and she could refer her to Pain management for some injections but pt said she would try to tough it out and may end up calling back for the referral to pain medicine.  I also told her to continue taking her aspirin/cholesterol medication.

## 2018-08-21 NOTE — TELEPHONE ENCOUNTER
----- Message from PIERRE Zee sent at 8/20/2018 12:55 PM EDT -----  Please notify patient MRI Brain appears normal for her age and medical history. She should continue her aspirin and cholesterol medication daily. Her cervical spine does show significant bulging disc. I am inquiring if she would like a referral to pain management for injections to help with her neck pain and radiating symptoms into her arms. If she does I will place a referral. If she does not improve with pain management we could refer her to neurosurgery. Thanks, PIERRE Wilcox    FAX MRI BRAIN AND C SPINE TO PCP FOR THEIR REVIEW.

## 2019-02-07 ENCOUNTER — OFFICE VISIT (OUTPATIENT)
Dept: NEUROLOGY | Facility: CLINIC | Age: 59
End: 2019-02-07

## 2019-02-07 VITALS
WEIGHT: 198 LBS | HEART RATE: 74 BPM | HEIGHT: 62 IN | SYSTOLIC BLOOD PRESSURE: 128 MMHG | DIASTOLIC BLOOD PRESSURE: 82 MMHG | OXYGEN SATURATION: 97 % | BODY MASS INDEX: 36.44 KG/M2

## 2019-02-07 DIAGNOSIS — M79.7 FIBROMYALGIA: ICD-10-CM

## 2019-02-07 DIAGNOSIS — M54.12 CERVICAL RADICULOPATHY: ICD-10-CM

## 2019-02-07 DIAGNOSIS — E11.42 DIABETIC PERIPHERAL NEUROPATHY (HCC): ICD-10-CM

## 2019-02-07 DIAGNOSIS — M54.2 NECK PAIN: Primary | ICD-10-CM

## 2019-02-07 PROCEDURE — 99214 OFFICE O/P EST MOD 30 MIN: CPT | Performed by: PSYCHIATRY & NEUROLOGY

## 2019-02-07 RX ORDER — GABAPENTIN 400 MG/1
400 CAPSULE ORAL 2 TIMES DAILY
Qty: 60 CAPSULE | Refills: 5
Start: 2019-02-07

## 2019-02-07 NOTE — PROGRESS NOTES
Subjective:     Patient ID: Jackelin Ramsey is a 58 y.o. female.    CC:   Chief Complaint   Patient presents with   • Tremors     Needs refills on gabapentin       HPI:   History of Present Illness  The following portions of the patient's history were reviewed and updated as appropriate: allergies, current medications, past family history, past medical history, past social history, past surgical history and problem list.     C/O neck pain and spasms radiating toward the shoulders. MRI of cspine showed a moderated disc bulge at C5-6 with stenosis. Leg pain is stable and improved. Tolerating gabapentin. She has been recommended pain management for injections, would like to proceed.    Past Medical History:   Diagnosis Date   • Anxiety    • Arthritis    • Asthma    • Bursitis    • Cancer (CMS/HCC)    • Cervical disc disorder    • CTS (carpal tunnel syndrome)    • Diabetes (CMS/HCC)    • Diabetes mellitus (CMS/HCC)    • Diverticulosis    • GERD (gastroesophageal reflux disease)    • Hyperlipidemia    • Hypertension    • Lumbosacral disc disease        Past Surgical History:   Procedure Laterality Date   • BLADDER SURGERY     • DILATATION AND CURETTAGE     • HYSTERECTOMY         Social History     Socioeconomic History   • Marital status:      Spouse name: Not on file   • Number of children: Not on file   • Years of education: Not on file   • Highest education level: Not on file   Social Needs   • Financial resource strain: Not on file   • Food insecurity - worry: Not on file   • Food insecurity - inability: Not on file   • Transportation needs - medical: Not on file   • Transportation needs - non-medical: Not on file   Occupational History   • Occupation:      Comment: [employer]   Tobacco Use   • Smoking status: Never Smoker   • Smokeless tobacco: Never Used   Substance and Sexual Activity   • Alcohol use: No   • Drug use: No   • Sexual activity: Defer   Other Topics Concern   • Not  on file   Social History Narrative   • Not on file       Family History   Problem Relation Age of Onset   • Cancer Mother    • Heart disease Mother    • Hypertension Mother    • Diabetes Father    • Heart disease Father    • Hypertension Father    • Parkinsonism Maternal Aunt    • Rheum arthritis Other    • Ulcers Other    • Hyperlipidemia Other         Review of Systems   Constitutional: Negative.  Negative for chills, fatigue, fever and unexpected weight change.   HENT: Negative.  Negative for ear pain, hearing loss, nosebleeds, rhinorrhea and sore throat.    Eyes: Negative.  Negative for photophobia, pain, discharge, itching and visual disturbance.   Respiratory: Negative.  Negative for cough, chest tightness, shortness of breath and wheezing.    Cardiovascular: Negative.  Negative for chest pain, palpitations and leg swelling.   Gastrointestinal: Negative.  Negative for abdominal pain, blood in stool, constipation, diarrhea, nausea and vomiting.   Endocrine: Negative.    Genitourinary: Negative.  Negative for dysuria, frequency, hematuria and urgency.   Musculoskeletal: Positive for back pain and neck pain. Negative for arthralgias, gait problem, joint swelling, myalgias and neck stiffness.   Skin: Negative.  Negative for rash and wound.   Allergic/Immunologic: Negative.  Negative for environmental allergies and food allergies.   Neurological: Negative.  Negative for dizziness, tremors, seizures, syncope, speech difficulty, weakness, light-headedness, numbness and headaches.   Hematological: Negative.  Negative for adenopathy. Does not bruise/bleed easily.   Psychiatric/Behavioral: Negative.  Negative for agitation, confusion, decreased concentration, hallucinations, sleep disturbance and suicidal ideas. The patient is not nervous/anxious.         Objective:    Neurologic Exam     Mental Status   Oriented to person, place, and time.       Physical Exam   Constitutional: She is oriented to person, place, and  time. She appears well-developed and well-nourished.   Pulmonary/Chest: Effort normal.   Neurological: She is alert and oriented to person, place, and time.   Psychiatric: She has a normal mood and affect. Her behavior is normal. Thought content normal.       Assessment/Plan:       Jackelin was seen today for tremors.    Diagnoses and all orders for this visit:    Neck pain  -     gabapentin (NEURONTIN) 400 MG capsule; Take 1 capsule by mouth 2 (Two) Times a Day.  -     Ambulatory Referral to Pain Management    Fibromyalgia  -     gabapentin (NEURONTIN) 400 MG capsule; Take 1 capsule by mouth 2 (Two) Times a Day.    Cervical radiculopathy  -     gabapentin (NEURONTIN) 400 MG capsule; Take 1 capsule by mouth 2 (Two) Times a Day.  -     Ambulatory Referral to Pain Management    Diabetic peripheral neuropathy (CMS/HCC)  -     gabapentin (NEURONTIN) 400 MG capsule; Take 1 capsule by mouth 2 (Two) Times a Day.    The patient has read and signed the [facility] Controlled Substance Contract.  I will continue to see patient for regular follow up appointments.  They are well controlled on their medication.  RAUL is updated every 3 months. The patient is aware of the potential for addiction and dependence.         Surinder Farrar MD  2/7/2019

## 2019-03-11 ENCOUNTER — TELEPHONE (OUTPATIENT)
Dept: PAIN MEDICINE | Facility: CLINIC | Age: 59
End: 2019-03-11

## 2019-03-11 PROBLEM — M48.02 SPINAL STENOSIS OF CERVICAL REGION: Status: ACTIVE | Noted: 2019-03-11

## 2019-03-11 PROBLEM — F41.1 GENERALIZED ANXIETY DISORDER: Status: ACTIVE | Noted: 2019-03-11

## 2019-04-18 ENCOUNTER — TELEPHONE (OUTPATIENT)
Dept: PAIN MEDICINE | Facility: CLINIC | Age: 59
End: 2019-04-18

## 2019-04-18 NOTE — TELEPHONE ENCOUNTER
Patient called and left me a message wanting to reschedule the new patient appointment she cancelled back in March.   I returned her call to the number she said ([phone]), but it went straight to voicemail. I left her a message.

## 2019-12-17 ENCOUNTER — CONSULT (OUTPATIENT)
Dept: SLEEP MEDICINE | Facility: HOSPITAL | Age: 59
End: 2019-12-17

## 2019-12-17 VITALS
HEIGHT: 62 IN | HEART RATE: 82 BPM | WEIGHT: 202 LBS | SYSTOLIC BLOOD PRESSURE: 150 MMHG | OXYGEN SATURATION: 95 % | DIASTOLIC BLOOD PRESSURE: 80 MMHG | BODY MASS INDEX: 37.17 KG/M2

## 2019-12-17 DIAGNOSIS — G47.33 OSA ON CPAP: Primary | ICD-10-CM

## 2019-12-17 DIAGNOSIS — E66.9 OBESITY (BMI 30-39.9): ICD-10-CM

## 2019-12-17 DIAGNOSIS — Z99.89 OSA ON CPAP: Primary | ICD-10-CM

## 2019-12-17 PROBLEM — E11.9 TYPE 2 DIABETES MELLITUS (HCC): Status: ACTIVE | Noted: 2019-12-17

## 2019-12-17 PROBLEM — E88.01 AAT (ALPHA-1-ANTITRYPSIN) DEFICIENCY (HCC): Status: ACTIVE | Noted: 2019-12-17

## 2019-12-17 PROBLEM — I10 ESSENTIAL HYPERTENSION: Status: ACTIVE | Noted: 2019-12-17

## 2019-12-17 PROBLEM — J44.9 COPD (CHRONIC OBSTRUCTIVE PULMONARY DISEASE) (HCC): Status: ACTIVE | Noted: 2019-12-17

## 2019-12-17 PROCEDURE — 99243 OFF/OP CNSLTJ NEW/EST LOW 30: CPT | Performed by: INTERNAL MEDICINE

## 2019-12-17 NOTE — PROGRESS NOTES
Jackelin Ramsey is a 59 y.o. female.   Chief Complaint   Patient presents with   • Sleeping Problem       HPI     59 y.o. female seen in consultation at the request of Chintan Toney MD for evaluation of the above.     She has a longstanding history of obstructive sleep apnea.  She was diagnosed originally about 10-12 years ago per her best recollection.  She has been seen most recently by Dr. Pal and has been on auto CPAP therapy.  Her current settings are range of 9-16 cm H2O.  Her most recent study was in September 2017 at which time she had an AHI of 38.    She responds well to CPAP therapy.  She has occasional daytime somnolence on some days but this is more the exception than the rule.    Further details are as follows:    These questions are answered based upon her history prior to CPAP therapy.  On CPAP therapy the majority of these symptoms are resolved.    [place] Scale is: 13/24    Estimated average amount of sleep per night: 6  Number of times she wakes up at night: 2  Difficulty falling back asleep: yes  It usually takes 45 minutes to go to sleep.  She feels sleepy upon waking up: yes  Rotating or night shift work: no    Drowsiness/Sleepiness:  She exhibits the following:  excessive daytime sleepiness  excessive daytime fatigue  falls asleep watching TV  falls asleep during times of the day when she is quiet  difficulty driving due to sleepiness  sleepy even while on vacation  sleepy even when sleep time is increased    Snoring/Breathing:  She exhibits the following:  loud snoring  snores in all sleep positions  awoken with dry mouth  quits breathing at night  awakens with respiratory discomfort  sore throat when waking up in the morning  trouble breathing through nose at night  morning headaches    Reflux:  She describes the following:  takes medication for reflux    Narcolepsy:  She exhibits the following:  sudden episodes of sleep during the day  feeling of paralysis while going to sleep or coming  out of sleep    RLS/PLMs:  She describes the following:  moves or jerks during sleep  discomfort in legs with an urge to move them    Insomnia:  She describes the following:  problems initiating sleep at night  frequent awakenings  bothered by pain at night  restless sleep    Parasomnia:  She exhibits the following:  excessive sweating while sleeping  grinds teeth    Weight:  Weight change in the last year:  gain: 0lbs  loss: 0lbs      The patient's relevant past medical, surgical, family, and social history reviewed and updated in Epic as appropriate.    Current medications are:   Current Outpatient Medications:   •  Alcohol Swabs (PHARMACIST CHOICE ALCOHOL) pads, , Disp: , Rfl:   •  ALL DAY ALLERGY 10 MG tablet, , Disp: , Rfl: 5  •  aspirin 81 MG tablet, Take 81 mg by mouth Daily., Disp: , Rfl:   •  busPIRone (BUSPAR) 7.5 MG tablet, , Disp: , Rfl:   •  Calcium Carbonate 1500 (600 Ca) MG tablet, , Disp: , Rfl: 5  •  Elastic Bandages & Supports (WRIST BRACE DELUXE/LEFT L-XL) misc, , Disp: , Rfl: 0  •  fluticasone (FLONASE) 50 MCG/ACT nasal spray, , Disp: , Rfl: 0  •  gabapentin (NEURONTIN) 400 MG capsule, Take 1 capsule by mouth 2 (Two) Times a Day., Disp: 60 capsule, Rfl: 5  •  glipiZIDE (GLUCOTROL) 10 MG tablet, , Disp: , Rfl: 2  •  lansoprazole (PREVACID) 30 MG capsule, , Disp: , Rfl:   •  losartan (COZAAR) 50 MG tablet, , Disp: , Rfl: 2  •  methocarbamol (ROBAXIN) 750 MG tablet, , Disp: , Rfl: 0  •  metoprolol tartrate (LOPRESSOR) 25 MG tablet, , Disp: , Rfl: 2  •  montelukast (SINGULAIR) 10 MG tablet, , Disp: , Rfl: 5  •  ONETOUCH VERIO test strip, , Disp: , Rfl:   •  PHARMACIST CHOICE LANCETS [facility] [place], , Disp: , Rfl:   •  Probiotic Product (PROBIOTIC DAILY PO), Take  by mouth Daily., Disp: , Rfl:   •  rosuvastatin (CRESTOR) 5 MG tablet, , Disp: , Rfl: 2  •  triamterene-hydrochlorothiazide (DYAZIDE) 37.5-25 MG per capsule, , Disp: , Rfl: 2  •  venlafaxine XR (EFFEXOR-XR) 75 MG 24 hr capsule, , Disp: , Rfl:  "2.    Review of Systems    Review of Systems  ROS documented in patient questionnaire ×14 systems.  Reviewed with patient.  Otherwise negative except as noted in HPI.    Physical Exam    Blood pressure 150/80, pulse 82, height 157.5 cm (62\"), weight 91.6 kg (202 lb), SpO2 95 %. Body mass index is 36.95 kg/m².    Physical Exam   Constitutional: She is oriented to person, place, and time. She appears well-developed and well-nourished.   HENT:   Head: Normocephalic and atraumatic.   Nose: Nose normal.   Mouth/Throat: Oropharynx is clear and moist. No oropharyngeal exudate.   Class II airway   Eyes: Conjunctivae are normal. No scleral icterus.   Neck: No tracheal deviation present. No thyromegaly present.   Cardiovascular: Normal rate and regular rhythm. Exam reveals no gallop and no friction rub.   No murmur heard.  Pulmonary/Chest: Effort normal. No respiratory distress. She has no wheezes. She has no rales.   Musculoskeletal: She exhibits no edema or deformity.   Neurological: She is alert and oriented to person, place, and time.   Skin: Skin is warm and dry. No rash noted.   Psychiatric: She has a normal mood and affect. Her behavior is normal.   Nursing note and vitals reviewed.      ASSESSMENT:    Problem List Items Addressed This Visit        Pulmonary Problems    LORI on CPAP - Primary       Other    Obesity (BMI 30-39.9)          59-year-old female with severe obstructive sleep apnea that appears to be well controlled on auto CPAP at a range of 9-16 cm H2O.  Her sleepiness is mostly resolved.  Her download indicates a normal AHI of 2.  Average CPAP pressure is 10 cm H2O.  Average usage is just under 7 hours per night.    PLAN:    1. No change in auto CPAP settings needed  2. She is interested in trying a nasal mask or nasal pillow mask and I see no reason this could not be done with an average pressure of 10.  3. Encouraged her to increase her sleep time and CPAP time by an hour and work on continued weight " loss.  4. Yearly follow-up is appropriate unless she has new problems prior to her scheduled visit.  She will call in that instance.    I have reviewed the results of my evaluation and impression and discussed my recommendations in detail with the patient.    Level of Risk Low due to:  one stable chronic illnesss    Signed by  Surinder Malhotra MD    December 17, 2019      CC: Chintan Toney MD Santos, Edwin, MD

## 2020-01-20 ENCOUNTER — OFFICE VISIT (OUTPATIENT)
Dept: PULMONOLOGY | Facility: CLINIC | Age: 60
End: 2020-01-20

## 2020-01-20 VITALS
SYSTOLIC BLOOD PRESSURE: 144 MMHG | HEART RATE: 69 BPM | HEIGHT: 62 IN | WEIGHT: 201 LBS | BODY MASS INDEX: 36.99 KG/M2 | RESPIRATION RATE: 16 BRPM | TEMPERATURE: 97.2 F | OXYGEN SATURATION: 97 % | DIASTOLIC BLOOD PRESSURE: 90 MMHG

## 2020-01-20 DIAGNOSIS — J45.909 IDIOPATHIC ASTHMA: Primary | ICD-10-CM

## 2020-01-20 DIAGNOSIS — Z78.9 NON-SMOKER: ICD-10-CM

## 2020-01-20 DIAGNOSIS — Z99.89 OSA ON CPAP: ICD-10-CM

## 2020-01-20 DIAGNOSIS — Z14.8 ALPHA-1-ANTITRYPSIN DEFICIENCY CARRIER: ICD-10-CM

## 2020-01-20 DIAGNOSIS — E66.9 OBESITY, CLASS II, BMI 35-39.9: ICD-10-CM

## 2020-01-20 DIAGNOSIS — G47.33 OSA ON CPAP: ICD-10-CM

## 2020-01-20 PROBLEM — Z87.09 HISTORY OF ASTHMA: Status: ACTIVE | Noted: 2020-01-20

## 2020-01-20 PROCEDURE — 94375 RESPIRATORY FLOW VOLUME LOOP: CPT | Performed by: INTERNAL MEDICINE

## 2020-01-20 PROCEDURE — 81332 SERPINA1 GENE: CPT | Performed by: INTERNAL MEDICINE

## 2020-01-20 PROCEDURE — 94729 DIFFUSING CAPACITY: CPT | Performed by: INTERNAL MEDICINE

## 2020-01-20 PROCEDURE — 82103 ALPHA-1-ANTITRYPSIN TOTAL: CPT | Performed by: INTERNAL MEDICINE

## 2020-01-20 PROCEDURE — 99204 OFFICE O/P NEW MOD 45 MIN: CPT | Performed by: INTERNAL MEDICINE

## 2020-01-20 PROCEDURE — 94726 PLETHYSMOGRAPHY LUNG VOLUMES: CPT | Performed by: INTERNAL MEDICINE

## 2020-01-20 RX ORDER — ROSUVASTATIN CALCIUM 10 MG/1
10 TABLET, COATED ORAL DAILY
COMMUNITY
Start: 2019-12-27

## 2020-01-20 RX ORDER — INSULIN ASPART 100 [IU]/ML
INJECTION, SOLUTION INTRAVENOUS; SUBCUTANEOUS 2 TIMES DAILY
COMMUNITY
Start: 2020-01-09

## 2020-01-20 RX ORDER — IPRATROPIUM BROMIDE 21 UG/1
2 SPRAY, METERED NASAL 2 TIMES DAILY
COMMUNITY
Start: 2019-12-12

## 2020-01-20 RX ORDER — BUSPIRONE HYDROCHLORIDE 15 MG/1
15 TABLET ORAL 2 TIMES DAILY
COMMUNITY
Start: 2020-01-03

## 2020-01-20 RX ORDER — INSULIN GLARGINE 100 [IU]/ML
INJECTION, SOLUTION SUBCUTANEOUS NIGHTLY
COMMUNITY
Start: 2020-01-03 | End: 2023-04-06

## 2020-01-20 RX ORDER — OMEPRAZOLE 40 MG/1
40 CAPSULE, DELAYED RELEASE ORAL DAILY
COMMUNITY
Start: 2020-01-06

## 2020-01-20 RX ORDER — SERTRALINE HYDROCHLORIDE 25 MG/1
25 TABLET, FILM COATED ORAL DAILY
COMMUNITY
Start: 2020-01-14 | End: 2023-04-06

## 2020-01-20 RX ORDER — LOSARTAN POTASSIUM AND HYDROCHLOROTHIAZIDE 12.5; 5 MG/1; MG/1
1 TABLET ORAL DAILY
COMMUNITY
Start: 2020-01-14

## 2020-01-20 RX ORDER — REPAGLINIDE 0.5 MG/1
0.5 TABLET ORAL
COMMUNITY
Start: 2020-01-03

## 2020-01-20 RX ORDER — TRIAMTERENE AND HYDROCHLOROTHIAZIDE 37.5; 25 MG/1; MG/1
TABLET ORAL
COMMUNITY
Start: 2020-01-03

## 2020-01-20 RX ORDER — IBUPROFEN 200 MG
600 CAPSULE ORAL
COMMUNITY
Start: 2020-01-03 | End: 2023-04-06

## 2020-01-20 RX ORDER — LIRAGLUTIDE 6 MG/ML
1.8 INJECTION SUBCUTANEOUS DAILY
COMMUNITY
Start: 2020-01-09

## 2020-01-20 NOTE — PROGRESS NOTES
PULMONARY  NOTE    Chief Complaint     History of alpha-1 antitrypsin carrier status, LORI, history of asthma, non-smoker, reflux, cough    History of Present Illness     59-year-old white female referred to our office for evaluation of chronic lung disease.    She has a history of asthma.  She has been on Brio for many years.  She feels that it helps with the symptoms of dyspnea and wheezing.  She infrequently uses albuterol, approximately once per month.    She has had episodes of acute bronchitis or pneumonia about 1 or twice a year.  She has not been on steroids during an exacerbation due to her history of diabetes.    She has a history of reflux.  She remains on a PPI but despite that continues to have reflux symptoms.  She is not following reflux precautions.    She has a cough that occurs both at night and typically in the morning.  She has minimal sputum production.  She has never had hemoptysis.    She has a history of obstructive sleep apnea and is on CPAP which she uses nightly.    She was screened for alpha-1 antitrypsin deficiency several years ago.  I have a copy of the test result and all that appears to indicate is that she had one abnormal Z allele  No indication of exactly what her genetic make-up was or an alpha-1 antitrypsin level.    Patient Active Problem List   Diagnosis   • Lumbar radiculopathy   • Neck pain   • Essential tremor   • Diabetic peripheral neuropathy (CMS/HCC)   • Bilateral carpal tunnel syndrome   • Arthropathy of right shoulder   • Cervical radiculopathy   • Fibromyalgia   • Encounter for drug therapy   • Spinal stenosis of cervical region   • Generalized anxiety disorder   • Type 2 diabetes mellitus (CMS/HCC)   • Essential hypertension   • LORI on CPAP   • Alpha-1-antitrypsin deficiency carrier (MZ)   • Obesity, Class II, BMI 35-39.9   • History of asthma   • Non-smoker     Allergies   Allergen Reactions   • Flagyl [Metronidazole] Other (See Comments)     Severe weakness   •  Farxiga [Dapagliflozin] Myalgia   • Invokana [Canagliflozin] Myalgia   • Penicillins    • Percocet [Oxycodone-Acetaminophen]    • Tetracyclines & Related        Current Outpatient Medications:   •  Alcohol Swabs (PHARMACIST CHOICE ALCOHOL) pads, , Disp: , Rfl:   •  ALL DAY ALLERGY 10 MG tablet, , Disp: , Rfl: 5  •  aspirin 81 MG tablet, Take 81 mg by mouth Daily., Disp: , Rfl:   •  BREO ELLIPTA 200-25 MCG/INH inhaler, Inhale 1 puff Daily., Disp: , Rfl:   •  busPIRone (BUSPAR) 15 MG tablet, Take 15 mg by mouth 2 (Two) Times a Day., Disp: , Rfl:   •  CALCIUM 600 600 MG tablet, Take 600 mg by mouth., Disp: , Rfl:   •  Elastic Bandages & Supports (WRIST BRACE DELUXE/LEFT L-XL) misc, , Disp: , Rfl: 0  •  fluticasone (FLONASE) 50 MCG/ACT nasal spray, 2 sprays into the nostril(s) as directed by provider Daily., Disp: , Rfl: 0  •  gabapentin (NEURONTIN) 400 MG capsule, Take 1 capsule by mouth 2 (Two) Times a Day., Disp: 60 capsule, Rfl: 5  •  glipiZIDE (GLUCOTROL) 10 MG tablet, Take 10 mg by mouth 2 (Two) Times a Day Before Meals., Disp: , Rfl: 2  •  Insulin Glargine (BASAGLAR KWIKPEN) 100 UNIT/ML injection pen, Inject  under the skin into the appropriate area as directed Every Night., Disp: , Rfl:   •  ipratropium (ATROVENT) 0.03 % nasal spray, 2 sprays into the nostril(s) as directed by provider 2 (Two) Times a Day., Disp: , Rfl:   •  lansoprazole (PREVACID) 30 MG capsule, Take 30 mg by mouth Daily., Disp: , Rfl:   •  losartan-hydrochlorothiazide (HYZAAR) 50-12.5 MG per tablet, Take 1 tablet by mouth Daily., Disp: , Rfl:   •  metFORMIN (GLUCOPHAGE) 500 MG tablet, Take 500 mg by mouth Daily With Breakfast., Disp: , Rfl:   •  methocarbamol (ROBAXIN) 750 MG tablet, Take 750 mg by mouth At Night As Needed., Disp: , Rfl: 0  •  metoprolol tartrate (LOPRESSOR) 25 MG tablet, Take 25 mg by mouth 2 (Two) Times a Day., Disp: , Rfl: 2  •  montelukast (SINGULAIR) 10 MG tablet, Take 10 mg by mouth Every Night., Disp: , Rfl: 5  •   NOVOLOG FLEXPEN 100 UNIT/ML solution pen-injector sc pen, 2 (Two) Times a Day., Disp: , Rfl:   •  omeprazole (priLOSEC) 40 MG capsule, Take 40 mg by mouth Daily., Disp: , Rfl:   •  ONETOUCH VERIO test strip, , Disp: , Rfl:   •  PHARMACIST CHOICE LANCETS misc, , Disp: , Rfl:   •  Probiotic Product (PROBIOTIC DAILY PO), Take  by mouth Daily., Disp: , Rfl:   •  repaglinide (PRANDIN) 0.5 MG tablet, Take 0.5 mg by mouth 3 (Three) Times a Day Before Meals., Disp: , Rfl:   •  rosuvastatin (CRESTOR) 10 MG tablet, Take 10 mg by mouth Daily., Disp: , Rfl:   •  sertraline (ZOLOFT) 25 MG tablet, Take 25 mg by mouth Daily., Disp: , Rfl:   •  triamterene-hydrochlorothiazide (DYAZIDE) 37.5-25 MG per capsule, Take 1 capsule by mouth Daily., Disp: , Rfl: 2  •  venlafaxine XR (EFFEXOR-XR) 75 MG 24 hr capsule, Take 75 mg by mouth Daily., Disp: , Rfl: 2  •  busPIRone (BUSPAR) 7.5 MG tablet, , Disp: , Rfl:   •  Calcium Carbonate 1500 (600 Ca) MG tablet, , Disp: , Rfl: 5  •  losartan (COZAAR) 50 MG tablet, , Disp: , Rfl: 2  •  rosuvastatin (CRESTOR) 5 MG tablet, , Disp: , Rfl: 2  •  triamterene-hydrochlorothiazide (MAXZIDE-25) 37.5-25 MG per tablet, , Disp: , Rfl:   •  VICTOZA 18 MG/3ML solution pen-injector injection, Inject 1.8 mg under the skin into the appropriate area as directed Daily., Disp: , Rfl:   MEDICATION LIST AND ALLERGIES REVIEWED.    Family History   Problem Relation Age of Onset   • Cancer Mother    • Heart disease Mother    • Hypertension Mother    • Diabetes Father    • Heart disease Father    • Hypertension Father    • Parkinsonism Maternal Aunt    • Rheum arthritis Other    • Ulcers Other    • Hyperlipidemia Other      Social History     Tobacco Use   • Smoking status: Never Smoker   • Smokeless tobacco: Never Used   Substance Use Topics   • Alcohol use: No   • Drug use: No     Social History     Social History Narrative   • Not on file     FAMILY AND SOCIAL HISTORY REVIEWED.    Review of Systems  ALSO REFER TO  "SCANNED ROS SHEET FROM SAME DATE.    /90 (BP Location: Left arm, Patient Position: Sitting, Cuff Size: Adult)   Pulse 69   Temp 97.2 °F (36.2 °C)   Resp 16   Ht 157.5 cm (62\")   Wt 91.2 kg (201 lb)   SpO2 97% Comment: room air at rest  BMI 36.76 kg/m²   Physical Exam   Constitutional: She is oriented to person, place, and time. She appears well-developed. No distress.   HENT:   Head: Normocephalic and atraumatic.   Neck: No thyromegaly present.   Cardiovascular: Normal rate, regular rhythm and normal heart sounds.   No murmur heard.  Pulmonary/Chest: Effort normal and breath sounds normal. No stridor.   Abdominal: Soft. Bowel sounds are normal.   Musculoskeletal: Normal range of motion. She exhibits no edema.   Lymphadenopathy:     She has no cervical adenopathy.        Right: No supraclavicular and no epitrochlear adenopathy present.        Left: No supraclavicular and no epitrochlear adenopathy present.   Neurological: She is alert and oriented to person, place, and time.   Skin: Skin is warm and dry. She is not diaphoretic.   Psychiatric: She has a normal mood and affect. Her behavior is normal.   Nursing note and vitals reviewed.      Results     PFTs reveal no airway obstruction, no restriction, and a normal diffusion capacity.    Chest x-ray today reveals no infiltrates or consolidation or effusions.   There is one line of linear atelectasis in the right upper lobe    Problem List       ICD-10-CM ICD-9-CM   1. Asthma J45.909 493.90   2. Non-smoker Z78.9 V49.89   3. Alpha-1-antitrypsin deficiency carrier (MZ) Z14.8 V83.89   4. LORI on CPAP G47.33 327.23    Z99.89 V46.8   5. Obesity, Class II, BMI 35-39.9 E66.9 278.00       Discussion     We reviewed her test results.  Based on her previous screen, she does not have alpha-1 antitrypsin deficiency.  She appears to be a carrier with 1 abnormal allele.  We will confirm this with a blood alpha-1 antitrypsin phenotype and level.  Assuming she is an MZ " carrier with normal level, no further work-up or treatment is necessary.    We discussed the impact of being an alpha-1 carrier on her children.  I recommended genetic testing and counseling for her children.  Her son accompanied her to the office today so we did a buccal smear screen on him.  I gave her a test kit for her daughter.  I gave her literature from the alpha-1 antitrypsin Society as well as referral information to the [facility] genetic counseling center.    She has a history of asthma.  Spirometry is normal today.  She is on asthma medication which she feels helps.  She has infrequent exacerbations and rare use of albuterol.  We will keep her on Brio for the time.    I encourage efforts at weight loss.  Have encouraged continued compliance with CPAP.    We discussed reflux precautions.  Gave her reflux information sheet.    I will plan to see her back in 6 months.    Marco A Hassan MD  Note electronically signed    CC: Chintan Toney MD

## 2020-01-21 LAB — ALPHA1 GLOB MFR UR ELPH: 92 MG/DL (ref 90–200)

## 2020-01-27 LAB
PATHOLOGIST NAME: NORMAL
SERPINA1 GENE MUT ANL BLD/T: NORMAL
SERPINA1 GENE MUT TESTED BLD/T: NORMAL

## 2021-01-06 ENCOUNTER — OFFICE VISIT (OUTPATIENT)
Dept: SLEEP MEDICINE | Facility: HOSPITAL | Age: 61
End: 2021-01-06

## 2021-01-06 VITALS
HEART RATE: 69 BPM | WEIGHT: 206.8 LBS | DIASTOLIC BLOOD PRESSURE: 77 MMHG | BODY MASS INDEX: 38.05 KG/M2 | SYSTOLIC BLOOD PRESSURE: 168 MMHG | OXYGEN SATURATION: 98 % | HEIGHT: 62 IN

## 2021-01-06 DIAGNOSIS — Z99.89 OSA ON CPAP: Primary | ICD-10-CM

## 2021-01-06 DIAGNOSIS — E66.9 OBESITY (BMI 30-39.9): ICD-10-CM

## 2021-01-06 DIAGNOSIS — G47.33 OSA ON CPAP: Primary | ICD-10-CM

## 2021-01-06 PROCEDURE — 99212 OFFICE O/P EST SF 10 MIN: CPT | Performed by: NURSE PRACTITIONER

## 2021-01-06 NOTE — PROGRESS NOTES
Chief Complaint:   Chief Complaint   Patient presents with   • Follow-up       HPI:    Jackelin Ramsey is a 60 y.o. female here for follow-up of sleep apnea.  Patient was last seen 12/17/2019.  Patient states she continues to do well with CPAP therapy.  Patient is sleeping approximately 6 to 8 hours nightly and does feel rested upon awakening.  Patient will go to sleep within 1 hour and does not get up during the night.  Patient has an [place] score of 9/24.  Patient has no concerns or complaints and wishes to continue with CPAP        Current medications are:   Current Outpatient Medications:   •  Alcohol Swabs (PHARMACIST CHOICE ALCOHOL) pads, , Disp: , Rfl:   •  ALL DAY ALLERGY 10 MG tablet, , Disp: , Rfl: 5  •  aspirin 81 MG tablet, Take 81 mg by mouth Daily., Disp: , Rfl:   •  BREO ELLIPTA 200-25 MCG/INH inhaler, Inhale 1 puff Daily., Disp: , Rfl:   •  busPIRone (BUSPAR) 15 MG tablet, Take 15 mg by mouth 2 (Two) Times a Day., Disp: , Rfl:   •  CALCIUM 600 600 MG tablet, Take 600 mg by mouth., Disp: , Rfl:   •  Calcium Carbonate 1500 (600 Ca) MG tablet, , Disp: , Rfl: 5  •  Elastic Bandages & Supports (WRIST BRACE DELUXE/LEFT L-XL) misc, , Disp: , Rfl: 0  •  fluticasone (FLONASE) 50 MCG/ACT nasal spray, 2 sprays into the nostril(s) as directed by provider Daily., Disp: , Rfl: 0  •  gabapentin (NEURONTIN) 400 MG capsule, Take 1 capsule by mouth 2 (Two) Times a Day., Disp: 60 capsule, Rfl: 5  •  glipiZIDE (GLUCOTROL) 10 MG tablet, Take 10 mg by mouth 2 (Two) Times a Day Before Meals., Disp: , Rfl: 2  •  Insulin Glargine (BASAGLAR KWIKPEN) 100 UNIT/ML injection pen, Inject  under the skin into the appropriate area as directed Every Night., Disp: , Rfl:   •  ipratropium (ATROVENT) 0.03 % nasal spray, 2 sprays into the nostril(s) as directed by provider 2 (Two) Times a Day., Disp: , Rfl:   •  losartan-hydrochlorothiazide (HYZAAR) 50-12.5 MG per tablet, Take 1 tablet by mouth Daily., Disp: , Rfl:   •  metFORMIN  (GLUCOPHAGE) 500 MG tablet, Take 500 mg by mouth Daily With Breakfast., Disp: , Rfl:   •  methocarbamol (ROBAXIN) 750 MG tablet, Take 750 mg by mouth At Night As Needed., Disp: , Rfl: 0  •  metoprolol tartrate (LOPRESSOR) 25 MG tablet, Take 25 mg by mouth 2 (Two) Times a Day., Disp: , Rfl: 2  •  montelukast (SINGULAIR) 10 MG tablet, Take 10 mg by mouth Every Night., Disp: , Rfl: 5  •  NOVOLOG FLEXPEN 100 UNIT/ML solution pen-injector sc pen, 2 (Two) Times a Day., Disp: , Rfl:   •  omeprazole (priLOSEC) 40 MG capsule, Take 40 mg by mouth Daily., Disp: , Rfl:   •  ONETOUCH VERIO test strip, , Disp: , Rfl:   •  PHARMACIST CHOICE LANCETS misc, , Disp: , Rfl:   •  Probiotic Product (PROBIOTIC DAILY PO), Take  by mouth Daily., Disp: , Rfl:   •  repaglinide (PRANDIN) 0.5 MG tablet, Take 0.5 mg by mouth 3 (Three) Times a Day Before Meals., Disp: , Rfl:   •  rosuvastatin (CRESTOR) 10 MG tablet, Take 10 mg by mouth Daily., Disp: , Rfl:   •  sertraline (ZOLOFT) 25 MG tablet, Take 25 mg by mouth Daily., Disp: , Rfl:   •  triamterene-hydrochlorothiazide (DYAZIDE) 37.5-25 MG per capsule, Take 1 capsule by mouth Daily., Disp: , Rfl: 2  •  VICTOZA 18 MG/3ML solution pen-injector injection, Inject 1.8 mg under the skin into the appropriate area as directed Daily., Disp: , Rfl:   •  busPIRone (BUSPAR) 7.5 MG tablet, , Disp: , Rfl:   •  lansoprazole (PREVACID) 30 MG capsule, Take 30 mg by mouth Daily., Disp: , Rfl:   •  losartan (COZAAR) 50 MG tablet, , Disp: , Rfl: 2  •  rosuvastatin (CRESTOR) 5 MG tablet, , Disp: , Rfl: 2  •  triamterene-hydrochlorothiazide (MAXZIDE-25) 37.5-25 MG per tablet, , Disp: , Rfl:   •  venlafaxine XR (EFFEXOR-XR) 75 MG 24 hr capsule, Take 75 mg by mouth Daily., Disp: , Rfl: 2.      The patient's relevant past medical, surgical, family and social history were reviewed and updated in Epic as appropriate.       Review of Systems   Eyes: Positive for visual disturbance.   Respiratory: Positive for apnea,  shortness of breath and wheezing.    Gastrointestinal:        Heartburn   Musculoskeletal: Positive for arthralgias, back pain, joint swelling, neck pain and neck stiffness.   Allergic/Immunologic: Positive for environmental allergies.   Neurological: Positive for tremors and numbness.   Psychiatric/Behavioral: Positive for sleep disturbance. The patient is nervous/anxious.    All other systems reviewed and are negative.        Objective:    Physical Exam  Vitals signs reviewed.   Constitutional:       Appearance: Normal appearance. She is obese.   HENT:      Head: Normocephalic and atraumatic.      Mouth/Throat:      Mouth: Mucous membranes are moist.      Pharynx: Oropharynx is clear.      Comments: Class 2 airway  Eyes:      Conjunctiva/sclera: Conjunctivae normal.      Pupils: Pupils are equal, round, and reactive to light.   Cardiovascular:      Rate and Rhythm: Normal rate.   Pulmonary:      Effort: Pulmonary effort is normal.      Breath sounds: Normal breath sounds.   Skin:     General: Skin is dry.      Coloration: Skin is not pale.      Findings: No erythema.   Neurological:      Mental Status: She is alert and oriented to person, place, and time.   Psychiatric:         Mood and Affect: Mood normal.         Behavior: Behavior normal.         Thought Content: Thought content normal.         Judgment: Judgment normal.       30/30 days of use  Greater than 4-hour use 100%  90% pressure 11.6  AHI of 2.8  Settings 9-16    ASSESSMENT/PLAN    Diagnoses and all orders for this visit:    1. LORI on CPAP (Primary)  -     CPAP Therapy    2. Obesity (BMI 30-39.9)            1. Counseled patient regarding multimodal approach with healthy nutrition, healthy sleep, regular physical activity, social activities, counseling, and medications. Encouraged to practice lateral sleep position. Avoid alcohol and sedatives close to bedtime.  2.   Refill supplies x1 year.  Return to clinic 1 year or sooner symptoms warrant.  I have  reviewed the results of my evaluation and impression and discussed my recommendations in detail with the patient.      Signed by  Rosangela Cruz, APRN    January 6, 2021      CC: Chintan Toney MD          No ref. provider found

## 2021-07-07 ENCOUNTER — TELEPHONE (OUTPATIENT)
Dept: SLEEP MEDICINE | Facility: HOSPITAL | Age: 61
End: 2021-07-07

## 2021-07-07 NOTE — TELEPHONE ENCOUNTER
Pt's daughter lvm regarding cpap recall. Called back and pt's  answered. Both have affected cpap machines. Trey told her to stop using the machine. Pt's  wanted to know what to do. Informed pt we cannot tell him what to do and they need to make their decision on their own. Pt's  verbalized understanding and said he would call back when they make a decision.

## 2022-01-06 ENCOUNTER — OFFICE VISIT (OUTPATIENT)
Dept: SLEEP MEDICINE | Facility: HOSPITAL | Age: 62
End: 2022-01-06

## 2022-01-06 VITALS
DIASTOLIC BLOOD PRESSURE: 65 MMHG | BODY MASS INDEX: 38.39 KG/M2 | HEART RATE: 77 BPM | SYSTOLIC BLOOD PRESSURE: 160 MMHG | HEIGHT: 62 IN | WEIGHT: 208.6 LBS | OXYGEN SATURATION: 96 %

## 2022-01-06 DIAGNOSIS — G47.33 OSA ON CPAP: Primary | ICD-10-CM

## 2022-01-06 DIAGNOSIS — Z99.89 OSA ON CPAP: Primary | ICD-10-CM

## 2022-01-06 PROCEDURE — 99213 OFFICE O/P EST LOW 20 MIN: CPT | Performed by: NURSE PRACTITIONER

## 2022-01-06 NOTE — PROGRESS NOTES
Chief Complaint:   Chief Complaint   Patient presents with   • Follow-up       HPI:    Jackelin Ramsey is a 61 y.o. female here for follow-up of sleep apnea.  Patient was last seen 1/6/2021.  Patient states she has not used her CPAP for 8 months due to the Trey recall and she feels awful.  She has only sleeping 5 hours nightly and not rested upon awakening.  With CPAP she will go to sleep within 20 to 30 minutes and now is taking her up to 2 hours.  She does wake up several times during the night and with CPAP usually occasionally once she does have an [place] score of 17/24.  We did discuss today the consequences of untreated sleep apnea and patient says she has no complaints or concerns and will continue use until her new when arrives.  It has been [email or web address].        Current medications are:   Current Outpatient Medications:   •  Alcohol Swabs (PHARMACIST CHOICE ALCOHOL) pads, , Disp: , Rfl:   •  ALL DAY ALLERGY 10 MG tablet, , Disp: , Rfl: 5  •  aspirin 81 MG tablet, Take 81 mg by mouth Daily., Disp: , Rfl:   •  BREO ELLIPTA 200-25 MCG/INH inhaler, Inhale 1 puff Daily., Disp: , Rfl:   •  busPIRone (BUSPAR) 15 MG tablet, Take 15 mg by mouth 2 (Two) Times a Day., Disp: , Rfl:   •  CALCIUM 600 600 MG tablet, Take 600 mg by mouth., Disp: , Rfl:   •  Calcium Carbonate 1500 (600 Ca) MG tablet, , Disp: , Rfl: 5  •  Elastic Bandages & Supports (WRIST BRACE DELUXE/LEFT L-XL) misc, , Disp: , Rfl: 0  •  fluticasone (FLONASE) 50 MCG/ACT nasal spray, 2 sprays into the nostril(s) as directed by provider Daily., Disp: , Rfl: 0  •  gabapentin (NEURONTIN) 400 MG capsule, Take 1 capsule by mouth 2 (Two) Times a Day., Disp: 60 capsule, Rfl: 5  •  glipiZIDE (GLUCOTROL) 10 MG tablet, Take 10 mg by mouth 2 (Two) Times a Day Before Meals., Disp: , Rfl: 2  •  Insulin Glargine (BASAGLAR KWIKPEN) 100 UNIT/ML injection pen, Inject  under the skin into the appropriate area as directed Every Night., Disp: , Rfl:   •   ipratropium (ATROVENT) 0.03 % nasal spray, 2 sprays into the nostril(s) as directed by provider 2 (Two) Times a Day., Disp: , Rfl:   •  lansoprazole (PREVACID) 30 MG capsule, Take 30 mg by mouth Daily., Disp: , Rfl:   •  losartan (COZAAR) 50 MG tablet, , Disp: , Rfl: 2  •  losartan-hydrochlorothiazide (HYZAAR) 50-12.5 MG per tablet, Take 1 tablet by mouth Daily., Disp: , Rfl:   •  metFORMIN (GLUCOPHAGE) 500 MG tablet, Take 500 mg by mouth Daily With Breakfast., Disp: , Rfl:   •  methocarbamol (ROBAXIN) 750 MG tablet, Take 750 mg by mouth At Night As Needed., Disp: , Rfl: 0  •  metoprolol tartrate (LOPRESSOR) 25 MG tablet, Take 25 mg by mouth 2 (Two) Times a Day., Disp: , Rfl: 2  •  montelukast (SINGULAIR) 10 MG tablet, Take 10 mg by mouth Every Night., Disp: , Rfl: 5  •  NOVOLOG FLEXPEN 100 UNIT/ML solution pen-injector sc pen, 2 (Two) Times a Day., Disp: , Rfl:   •  omeprazole (priLOSEC) 40 MG capsule, Take 40 mg by mouth Daily., Disp: , Rfl:   •  ONETOUCH VERIO test strip, , Disp: , Rfl:   •  PHARMACIST CHOICE LANCETS mis, , Disp: , Rfl:   •  Probiotic Product (PROBIOTIC DAILY PO), Take  by mouth Daily., Disp: , Rfl:   •  repaglinide (PRANDIN) 0.5 MG tablet, Take 0.5 mg by mouth 3 (Three) Times a Day Before Meals., Disp: , Rfl:   •  rosuvastatin (CRESTOR) 10 MG tablet, Take 10 mg by mouth Daily., Disp: , Rfl:   •  rosuvastatin (CRESTOR) 5 MG tablet, , Disp: , Rfl: 2  •  sertraline (ZOLOFT) 25 MG tablet, Take 25 mg by mouth Daily., Disp: , Rfl:   •  triamterene-hydrochlorothiazide (DYAZIDE) 37.5-25 MG per capsule, Take 1 capsule by mouth Daily., Disp: , Rfl: 2  •  triamterene-hydrochlorothiazide (MAXZIDE-25) 37.5-25 MG per tablet, , Disp: , Rfl:   •  venlafaxine XR (EFFEXOR-XR) 75 MG 24 hr capsule, Take 75 mg by mouth Daily., Disp: , Rfl: 2  •  VICTOZA 18 MG/3ML solution pen-injector injection, Inject 1.8 mg under the skin into the appropriate area as directed Daily., Disp: , Rfl:   •  busPIRone (BUSPAR) 7.5 MG  tablet, , Disp: , Rfl: .      The patient's relevant past medical, surgical, family and social history were reviewed and updated in Epic as appropriate.       Review of Systems   Eyes: Positive for visual disturbance.   Respiratory: Positive for apnea, shortness of breath and wheezing.    Gastrointestinal:        Heartburn   Musculoskeletal: Positive for arthralgias, back pain, joint swelling, neck pain and neck stiffness.   Allergic/Immunologic: Positive for environmental allergies.   Neurological: Positive for tremors and numbness.   Psychiatric/Behavioral: Positive for sleep disturbance. The patient is nervous/anxious.    All other systems reviewed and are negative.        Objective:    Physical Exam  Constitutional:       Appearance: Normal appearance.   HENT:      Head: Normocephalic and atraumatic.      Mouth/Throat:      Comments: Mallampati 2 anatomy  Cardiovascular:      Rate and Rhythm: Normal rate and regular rhythm.   Pulmonary:      Effort: Pulmonary effort is normal.      Breath sounds: Normal breath sounds.   Skin:     General: Skin is warm and dry.   Neurological:      Mental Status: She is alert.   Psychiatric:         Mood and Affect: Mood normal.         Behavior: Behavior normal.         Thought Content: Thought content normal.         Judgment: Judgment normal.           ASSESSMENT/PLAN    Diagnoses and all orders for this visit:    1. LORI on CPAP (Primary)            1. Counseled patient regarding multimodal approach with healthy nutrition, healthy sleep, regular physical activity, social activities, counseling, and medications. Encouraged to practice lateral sleep position. Avoid alcohol and sedatives close to bedtime.  2. Patient encouraged to restart her CPAP use until her new Trey does arrive I will see her back in 6 months to reassess        I have reviewed the results of my evaluation and impression and discussed my recommendations in detail with the patient.      Signed by  Rosangela ARIAS  Nancy, APRN    January 6, 2022      CC: Chintan Toney MD          No ref. provider found

## 2022-04-07 ENCOUNTER — OFFICE VISIT (OUTPATIENT)
Dept: SLEEP MEDICINE | Facility: HOSPITAL | Age: 62
End: 2022-04-07

## 2022-04-07 VITALS
HEIGHT: 62 IN | HEART RATE: 77 BPM | WEIGHT: 209.4 LBS | DIASTOLIC BLOOD PRESSURE: 73 MMHG | OXYGEN SATURATION: 97 % | BODY MASS INDEX: 38.53 KG/M2 | SYSTOLIC BLOOD PRESSURE: 145 MMHG

## 2022-04-07 DIAGNOSIS — G47.33 OSA ON CPAP: Primary | ICD-10-CM

## 2022-04-07 DIAGNOSIS — Z99.89 OSA ON CPAP: Primary | ICD-10-CM

## 2022-04-07 PROCEDURE — 99213 OFFICE O/P EST LOW 20 MIN: CPT | Performed by: NURSE PRACTITIONER

## 2022-04-07 RX ORDER — ALBUTEROL SULFATE 90 UG/1
AEROSOL, METERED RESPIRATORY (INHALATION)
COMMUNITY
Start: 2022-03-02

## 2022-04-07 RX ORDER — INSULIN LISPRO 100 [IU]/ML
INJECTION, SOLUTION INTRAVENOUS; SUBCUTANEOUS
COMMUNITY
Start: 2022-02-15 | End: 2023-04-06

## 2022-04-07 NOTE — PROGRESS NOTES
Chief Complaint:   Chief Complaint   Patient presents with   • Follow-up       HPI:    Jackelin Ramsey is a 61 y.o. female here for follow-up of sleep apnea.  I last saw patient 1/6/2022 and she had not been using her CPAP as she was worried about the recall.  We did discuss that day the consequences of untreated sleep apnea versus her medical history.  I did strongly encourage patient to continue use.  Patient states she felt much better when using and decided to restart.  Patient states she is doing very well.  She sleeps 6 to 8 hours nightly and does feel rested upon awakening.  She goes to sleep easily and will not get up during the night.  Patient has an [place] score of 12/24.  Patient states she is doing well has no concerns or complaints and wishes to continue CPAP.        Current medications are:   Current Outpatient Medications:   •  albuterol sulfate  (90 Base) MCG/ACT inhaler, , Disp: , Rfl:   •  Alcohol Swabs (PHARMACIST CHOICE ALCOHOL) pads, , Disp: , Rfl:   •  ALL DAY ALLERGY 10 MG tablet, , Disp: , Rfl: 5  •  aspirin 81 MG tablet, Take 81 mg by mouth Daily., Disp: , Rfl:   •  BREO ELLIPTA 200-25 MCG/INH inhaler, Inhale 1 puff Daily., Disp: , Rfl:   •  busPIRone (BUSPAR) 15 MG tablet, Take 15 mg by mouth 2 (Two) Times a Day., Disp: , Rfl:   •  busPIRone (BUSPAR) 7.5 MG tablet, , Disp: , Rfl:   •  CALCIUM 600 600 MG tablet, Take 600 mg by mouth., Disp: , Rfl:   •  Calcium Carbonate 1500 (600 Ca) MG tablet, , Disp: , Rfl: 5  •  Elastic Bandages & Supports (WRIST BRACE DELUXE/LEFT L-XL) misc, , Disp: , Rfl: 0  •  fluticasone (FLONASE) 50 MCG/ACT nasal spray, 2 sprays into the nostril(s) as directed by provider Daily., Disp: , Rfl: 0  •  gabapentin (NEURONTIN) 400 MG capsule, Take 1 capsule by mouth 2 (Two) Times a Day., Disp: 60 capsule, Rfl: 5  •  glipiZIDE (GLUCOTROL) 10 MG tablet, Take 10 mg by mouth 2 (Two) Times a Day Before Meals., Disp: , Rfl: 2  •  HumaLOG KwikPen 100 UNIT/ML solution  pen-injector, INJECT 45 UNITS SUBCUTANEOUS IN THE AM, 45 UNITS AT NOON AND 45 UNITS AT 530PM, Disp: , Rfl:   •  Insulin Glargine (BASAGLAR KWIKPEN) 100 UNIT/ML injection pen, Inject  under the skin into the appropriate area as directed Every Night., Disp: , Rfl:   •  ipratropium (ATROVENT) 0.03 % nasal spray, 2 sprays into the nostril(s) as directed by provider 2 (Two) Times a Day., Disp: , Rfl:   •  lansoprazole (PREVACID) 30 MG capsule, Take 30 mg by mouth Daily., Disp: , Rfl:   •  losartan (COZAAR) 50 MG tablet, , Disp: , Rfl: 2  •  losartan-hydrochlorothiazide (HYZAAR) 50-12.5 MG per tablet, Take 1 tablet by mouth Daily., Disp: , Rfl:   •  metFORMIN (GLUCOPHAGE) 500 MG tablet, Take 500 mg by mouth Daily With Breakfast., Disp: , Rfl:   •  methocarbamol (ROBAXIN) 750 MG tablet, Take 750 mg by mouth At Night As Needed., Disp: , Rfl: 0  •  metoprolol tartrate (LOPRESSOR) 25 MG tablet, Take 25 mg by mouth 2 (Two) Times a Day., Disp: , Rfl: 2  •  montelukast (SINGULAIR) 10 MG tablet, Take 10 mg by mouth Every Night., Disp: , Rfl: 5  •  NOVOLOG FLEXPEN 100 UNIT/ML solution pen-injector sc pen, 2 (Two) Times a Day., Disp: , Rfl:   •  omeprazole (priLOSEC) 40 MG capsule, Take 40 mg by mouth Daily., Disp: , Rfl:   •  ONETOUCH VERIO test strip, , Disp: , Rfl:   •  PHARMACIST CHOICE LANCETS [facility] [place], , Disp: , Rfl:   •  Probiotic Product (PROBIOTIC DAILY PO), Take  by mouth Daily., Disp: , Rfl:   •  repaglinide (PRANDIN) 0.5 MG tablet, Take 0.5 mg by mouth 3 (Three) Times a Day Before Meals., Disp: , Rfl:   •  rosuvastatin (CRESTOR) 10 MG tablet, Take 10 mg by mouth Daily., Disp: , Rfl:   •  rosuvastatin (CRESTOR) 5 MG tablet, , Disp: , Rfl: 2  •  sertraline (ZOLOFT) 25 MG tablet, Take 25 mg by mouth Daily., Disp: , Rfl:   •  triamterene-hydrochlorothiazide (DYAZIDE) 37.5-25 MG per capsule, Take 1 capsule by mouth Daily., Disp: , Rfl: 2  •  triamterene-hydrochlorothiazide (MAXZIDE-25) 37.5-25 MG per tablet, , Disp: , Rfl:   •   "venlafaxine XR (EFFEXOR-XR) 75 MG 24 hr capsule, Take 75 mg by mouth Daily., Disp: , Rfl: 2  •  VICTOZA 18 MG/3ML solution pen-injector injection, Inject 1.8 mg under the skin into the appropriate area as directed Daily., Disp: , Rfl: .      The patient's relevant past medical, surgical, family and social history were reviewed and updated in Epic as appropriate.       Review of Systems   Eyes: Positive for visual disturbance.   Respiratory: Positive for apnea, shortness of breath and wheezing.    Gastrointestinal:        Heartburn   Musculoskeletal: Positive for arthralgias, back pain, myalgias, neck pain and neck stiffness.   Allergic/Immunologic: Positive for environmental allergies.   Neurological: Positive for tremors and numbness.   Psychiatric/Behavioral: Positive for sleep disturbance. The patient is nervous/anxious.    All other systems reviewed and are negative.        Objective:    Physical Exam  Constitutional:       Appearance: Normal appearance.   HENT:      Head: Normocephalic and atraumatic.      Mouth/Throat:      Comments: Class 2 airway  Cardiovascular:      Rate and Rhythm: Normal rate and regular rhythm.   Pulmonary:      Effort: Pulmonary effort is normal.      Breath sounds: Normal breath sounds.   Skin:     General: Skin is warm and dry.   Neurological:      Mental Status: She is alert.   Psychiatric:         Mood and Affect: Mood normal.         Behavior: Behavior normal.         Thought Content: Thought content normal.         Judgment: Judgment normal.     /73   Pulse 77   Ht 157.5 cm (62\")   Wt 95 kg (209 lb 6.4 oz)   SpO2 97%   BMI 38.30 kg/m²         ASSESSMENT/PLAN    Diagnoses and all orders for this visit:    1. LORI on CPAP (Primary)            1. Counseled patient regarding multimodal approach with healthy nutrition, healthy sleep, regular physical activity, social activities, counseling, and medications. Encouraged to practice lateral sleep position. Avoid alcohol and " sedatives close to bedtime.  2. Patient will continue CPAP therapy and is doing very well we will see her back in 1 year or sooner symptoms warrant.    I have reviewed the results of my evaluation and impression and discussed my recommendations in detail with the patient.      Signed by  PIERRE Thapa    April 7, 2022      CC: Chintan Toney MD          No ref. provider found

## 2023-04-06 ENCOUNTER — OFFICE VISIT (OUTPATIENT)
Dept: SLEEP MEDICINE | Facility: HOSPITAL | Age: 63
End: 2023-04-06
Payer: COMMERCIAL

## 2023-04-06 VITALS
BODY MASS INDEX: 40.56 KG/M2 | WEIGHT: 220.4 LBS | DIASTOLIC BLOOD PRESSURE: 62 MMHG | HEART RATE: 72 BPM | SYSTOLIC BLOOD PRESSURE: 133 MMHG | OXYGEN SATURATION: 95 % | HEIGHT: 62 IN

## 2023-04-06 DIAGNOSIS — G47.33 OSA ON CPAP: Primary | ICD-10-CM

## 2023-04-06 DIAGNOSIS — Z99.89 OSA ON CPAP: Primary | ICD-10-CM

## 2023-04-06 PROCEDURE — 99213 OFFICE O/P EST LOW 20 MIN: CPT | Performed by: NURSE PRACTITIONER

## 2023-04-06 RX ORDER — PANTOPRAZOLE SODIUM 40 MG/1
40 TABLET, DELAYED RELEASE ORAL 3 TIMES DAILY
COMMUNITY
Start: 2022-11-14

## 2023-04-06 NOTE — PROGRESS NOTES
Chief Complaint:   Chief Complaint   Patient presents with   • Follow-up       HPI:    Jackelin Ramsey is a 62 y.o. female here for follow-up of sleep apnea.  Patient was last seen 4/7/2022.  Patient states she has received her new Trey machine and is doing very well.  Patient is sleeping 6 to 8 hours nightly and does feel rested upon awakening.  Patient goes to sleep easily and does not get up during the night.  We did discuss patient's [place] score of 15/24.  Patient states she feels that out is how she feels today with a bad night sleep last night and not how she normally feels.  She is usually rested throughout the day.  She has no concerns or complaints regarding CPAP and wishes to continue therapy.        Current medications are:   Current Outpatient Medications:   •  albuterol sulfate  (90 Base) MCG/ACT inhaler, , Disp: , Rfl:   •  Alcohol Swabs (PHARMACIST CHOICE ALCOHOL) pads, , Disp: , Rfl:   •  ALL DAY ALLERGY 10 MG tablet, , Disp: , Rfl: 5  •  aspirin 81 MG tablet, Take 1 tablet by mouth Daily., Disp: , Rfl:   •  busPIRone (BUSPAR) 15 MG tablet, Take 1 tablet by mouth 2 (Two) Times a Day., Disp: , Rfl:   •  Calcium Carbonate 1500 (600 Ca) MG tablet, , Disp: , Rfl: 5  •  Elastic Bandages & Supports (WRIST BRACE DELUXE/LEFT L-XL) misc, , Disp: , Rfl: 0  •  fluticasone (FLONASE) 50 MCG/ACT nasal spray, 2 sprays into the nostril(s) as directed by provider Daily., Disp: , Rfl: 0  •  gabapentin (NEURONTIN) 400 MG capsule, Take 1 capsule by mouth 2 (Two) Times a Day., Disp: 60 capsule, Rfl: 5  •  ipratropium (ATROVENT) 0.03 % nasal spray, 2 sprays into the nostril(s) as directed by provider 2 (Two) Times a Day., Disp: , Rfl:   •  losartan (COZAAR) 50 MG tablet, , Disp: , Rfl: 2  •  losartan-hydrochlorothiazide (HYZAAR) 50-12.5 MG per tablet, Take 1 tablet by mouth Daily., Disp: , Rfl:   •  metFORMIN (GLUCOPHAGE) 500 MG tablet, Take 1 tablet by mouth Daily With Breakfast., Disp: , Rfl:   •   methocarbamol (ROBAXIN) 750 MG tablet, Take 1 tablet by mouth At Night As Needed., Disp: , Rfl: 0  •  metoprolol tartrate (LOPRESSOR) 25 MG tablet, Take 1 tablet by mouth 2 (Two) Times a Day., Disp: , Rfl: 2  •  montelukast (SINGULAIR) 10 MG tablet, Take 1 tablet by mouth Every Night., Disp: , Rfl: 5  •  NOVOLOG FLEXPEN 100 UNIT/ML solution pen-injector sc pen, 2 (Two) Times a Day., Disp: , Rfl:   •  omeprazole (priLOSEC) 40 MG capsule, Take 1 capsule by mouth Daily., Disp: , Rfl:   •  ONETOUCH VERIO test strip, , Disp: , Rfl:   •  pantoprazole (PROTONIX) 40 MG EC tablet, Take 1 tablet by mouth 3 (Three) Times a Day., Disp: , Rfl:   •  PHARMACIST CHOICE LANCETS misc, , Disp: , Rfl:   •  Probiotic Product (PROBIOTIC DAILY PO), Take  by mouth Daily., Disp: , Rfl:   •  repaglinide (PRANDIN) 0.5 MG tablet, Take 1 tablet by mouth 3 (Three) Times a Day Before Meals., Disp: , Rfl:   •  rosuvastatin (CRESTOR) 10 MG tablet, Take 1 tablet by mouth Daily., Disp: , Rfl:   •  triamterene-hydrochlorothiazide (DYAZIDE) 37.5-25 MG per capsule, Take 1 capsule by mouth Daily., Disp: , Rfl: 2  •  triamterene-hydrochlorothiazide (MAXZIDE-25) 37.5-25 MG per tablet, , Disp: , Rfl:   •  venlafaxine XR (EFFEXOR-XR) 75 MG 24 hr capsule, Take 1 capsule by mouth Daily., Disp: , Rfl: 2  •  VICTOZA 18 MG/3ML solution pen-injector injection, Inject 1.8 mg under the skin into the appropriate area as directed Daily., Disp: , Rfl:   •  BREO ELLIPTA 200-25 MCG/INH inhaler, Inhale 1 puff Daily. (Patient not taking: Reported on 4/6/2023), Disp: , Rfl: .      The patient's relevant past medical, surgical, family and social history were reviewed and updated in Epic as appropriate.       Review of Systems   Eyes: Positive for visual disturbance.   Respiratory: Positive for apnea, shortness of breath and wheezing.    Cardiovascular:        Heartburn   Musculoskeletal: Positive for arthralgias, back pain, myalgias, neck pain and neck stiffness.  "  Neurological: Positive for tremors and numbness.   Psychiatric/Behavioral: Positive for sleep disturbance. The patient is nervous/anxious.    All other systems reviewed and are negative.        Objective:    Physical Exam  Constitutional:       Appearance: Normal appearance.   HENT:      Head: Normocephalic and atraumatic.      Mouth/Throat:      Comments: Mallampati 2 anatomy  Cardiovascular:      Rate and Rhythm: Normal rate and regular rhythm.   Pulmonary:      Effort: Pulmonary effort is normal.      Breath sounds: Normal breath sounds.   Skin:     General: Skin is warm and dry.   Neurological:      Mental Status: She is alert and oriented to person, place, and time.   Psychiatric:         Mood and Affect: Mood normal.         Behavior: Behavior normal.         Thought Content: Thought content normal.         Judgment: Judgment normal.       /62 (BP Location: Left arm, Patient Position: Sitting)   Pulse 72   Ht 157.5 cm (62\")   Wt 100 kg (220 lb 6.4 oz)   SpO2 95%   BMI 40.31 kg/m²     CPAP Report  69/90 days of use  Greater than 4-hour use 76.7  AHI of 2.8  Settings 9-16  90% pressure 14.5      The patient continues to use and benefit from CPAP therapy.    ASSESSMENT/PLAN    Diagnoses and all orders for this visit:    1. LORI on CPAP (Primary)  -     PAP Therapy        1. Counseled patient regarding multimodal approach with healthy nutrition, healthy sleep, regular physical activity, social activities, counseling, and medications. Encouraged to practice lateral sleep position. Avoid alcohol and sedatives close to bedtime.  2.   Refill supplies x1 year.  Return to clinic 1 year sooner if symptoms warrant.    I have reviewed the results of my evaluation and impression and discussed my recommendations in detail with the patient.      Signed by  PIERRE Thapa    April 6, 2023      CC: Chintan Toney MD         No ref. provider found      "

## 2024-04-04 ENCOUNTER — OFFICE VISIT (OUTPATIENT)
Dept: SLEEP MEDICINE | Facility: CLINIC | Age: 64
End: 2024-04-04
Payer: COMMERCIAL

## 2024-04-04 VITALS
DIASTOLIC BLOOD PRESSURE: 72 MMHG | HEART RATE: 75 BPM | TEMPERATURE: 97.7 F | OXYGEN SATURATION: 97 % | BODY MASS INDEX: 40.27 KG/M2 | SYSTOLIC BLOOD PRESSURE: 124 MMHG | HEIGHT: 62 IN | WEIGHT: 218.8 LBS

## 2024-04-04 DIAGNOSIS — G47.30 HYPERSOMNIA WITH SLEEP APNEA: ICD-10-CM

## 2024-04-04 DIAGNOSIS — G47.10 HYPERSOMNIA WITH SLEEP APNEA: ICD-10-CM

## 2024-04-04 DIAGNOSIS — G47.33 OSA (OBSTRUCTIVE SLEEP APNEA): Primary | ICD-10-CM

## 2024-04-04 PROCEDURE — 99213 OFFICE O/P EST LOW 20 MIN: CPT | Performed by: NURSE PRACTITIONER

## 2024-04-04 RX ORDER — MODAFINIL 100 MG/1
100 TABLET ORAL DAILY
Qty: 30 TABLET | Refills: 1 | Status: SHIPPED | OUTPATIENT
Start: 2024-04-04

## 2024-04-04 NOTE — PROGRESS NOTES
Chief Complaint:   Chief Complaint   Patient presents with    Sleep Apnea     Yearly Compliance.       HPI:    Jackelin Ramsey is a 63 y.o. female here for follow-up of sleep apnea and hypersomnia with CPAP therapy.  Patient was last seen 4/6/2023.  Patient continues to do well with CPAP.  Patient will take a melatonin and will go to sleep easily.  She does get 6 to 8 hours of sleep nightly.  Patient has been awakening frequently but this is due to knee and hip pain.  She has been going to chiropractor and getting gel injections if this is not working for her very soon she is going to schedule knee replacement.  Patient puts [place] score at 16/24.  Patient states she is always sleepy.  We did discuss low-dose stimulant therapy and she does wish to move forward with this plan of care.        Current medications are:   Current Outpatient Medications:     albuterol sulfate  (90 Base) MCG/ACT inhaler, , Disp: , Rfl:     Alcohol Swabs (PHARMACIST CHOICE ALCOHOL) pads, , Disp: , Rfl:     ALL DAY ALLERGY 10 MG tablet, , Disp: , Rfl: 5    aspirin 81 MG tablet, Take 1 tablet by mouth Daily., Disp: , Rfl:     busPIRone (BUSPAR) 15 MG tablet, Take 1 tablet by mouth 2 (Two) Times a Day., Disp: , Rfl:     Calcium Carbonate 1500 (600 Ca) MG tablet, , Disp: , Rfl: 5    Elastic Bandages & Supports (WRIST BRACE DELUXE/LEFT L-XL) misc, , Disp: , Rfl: 0    fluticasone (FLONASE) 50 MCG/ACT nasal spray, 2 sprays into the nostril(s) as directed by provider Daily., Disp: , Rfl: 0    gabapentin (NEURONTIN) 400 MG capsule, Take 1 capsule by mouth 2 (Two) Times a Day., Disp: 60 capsule, Rfl: 5    ipratropium (ATROVENT) 0.03 % nasal spray, 2 sprays into the nostril(s) as directed by provider 2 (Two) Times a Day., Disp: , Rfl:     losartan (COZAAR) 50 MG tablet, , Disp: , Rfl: 2    losartan-hydrochlorothiazide (HYZAAR) 50-12.5 MG per tablet, Take 1 tablet by mouth Daily., Disp: , Rfl:     metFORMIN (GLUCOPHAGE) 500 MG tablet, Take 1  tablet by mouth Daily With Breakfast., Disp: , Rfl:     methocarbamol (ROBAXIN) 750 MG tablet, Take 1 tablet by mouth At Night As Needed., Disp: , Rfl: 0    metoprolol tartrate (LOPRESSOR) 25 MG tablet, Take 1 tablet by mouth 2 (Two) Times a Day., Disp: , Rfl: 2    modafinil (PROVIGIL) 100 MG tablet, Take 1 tablet by mouth Daily., Disp: 30 tablet, Rfl: 1    montelukast (SINGULAIR) 10 MG tablet, Take 1 tablet by mouth Every Night., Disp: , Rfl: 5    NOVOLOG FLEXPEN 100 UNIT/ML solution pen-injector sc pen, 2 (Two) Times a Day., Disp: , Rfl:     omeprazole (priLOSEC) 40 MG capsule, Take 1 capsule by mouth Daily., Disp: , Rfl:     ONETOUCH VERIO test strip, , Disp: , Rfl:     pantoprazole (PROTONIX) 40 MG EC tablet, Take 1 tablet by mouth 3 (Three) Times a Day., Disp: , Rfl:     PHARMACIST CHOICE LANCETS [facility] [place], , Disp: , Rfl:     Probiotic Product (PROBIOTIC DAILY PO), Take  by mouth Daily., Disp: , Rfl:     repaglinide (PRANDIN) 0.5 MG tablet, Take 1 tablet by mouth 3 (Three) Times a Day Before Meals., Disp: , Rfl:     rosuvastatin (CRESTOR) 10 MG tablet, Take 1 tablet by mouth Daily., Disp: , Rfl:     triamterene-hydrochlorothiazide (DYAZIDE) 37.5-25 MG per capsule, Take 1 capsule by mouth Daily., Disp: , Rfl: 2    triamterene-hydrochlorothiazide (MAXZIDE-25) 37.5-25 MG per tablet, , Disp: , Rfl:     venlafaxine XR (EFFEXOR-XR) 75 MG 24 hr capsule, Take 1 capsule by mouth Daily., Disp: , Rfl: 2    VICTOZA 18 MG/3ML solution pen-injector injection, Inject 1.8 mg under the skin into the appropriate area as directed Daily., Disp: , Rfl: .      The patient's relevant past medical, surgical, family and social history were reviewed and updated in Epic as appropriate.       Review of Systems   Eyes:  Positive for visual disturbance.   Respiratory:  Positive for apnea, shortness of breath and wheezing.    Gastrointestinal:         Heartburn   Musculoskeletal:  Positive for arthralgias, gait problem, myalgias, neck pain and  neck stiffness.   Neurological:  Positive for tremors and numbness.   Psychiatric/Behavioral:  Positive for sleep disturbance. The patient is nervous/anxious.    All other systems reviewed and are negative.        Objective:    Physical Exam  Constitutional:       Appearance: Normal appearance.   HENT:      Head: Normocephalic and atraumatic.      Mouth/Throat:      Comments: Class 2 airway  Cardiovascular:      Rate and Rhythm: Normal rate and regular rhythm.   Pulmonary:      Effort: Pulmonary effort is normal.      Breath sounds: Normal breath sounds.   Skin:     General: Skin is warm and dry.   Neurological:      Mental Status: She is alert and oriented to person, place, and time.   Psychiatric:         Mood and Affect: Mood normal.         Behavior: Behavior normal.         Thought Content: Thought content normal.         Judgment: Judgment normal.         CPAP Report  88/90 days of use  Greater than 4-hour usage 97.8  90% pressure 14.6  AHI of 2.8  Settings 9-16    The patient continues to use and benefit from CPAP therapy.    ASSESSMENT/PLAN    Diagnoses and all orders for this visit:    1. LORI (obstructive sleep apnea) (Primary)  -     PAP Therapy  -     modafinil (PROVIGIL) 100 MG tablet; Take 1 tablet by mouth Daily.  Dispense: 30 tablet; Refill: 1    2. Hypersomnia with sleep apnea  -     modafinil (PROVIGIL) 100 MG tablet; Take 1 tablet by mouth Daily.  Dispense: 30 tablet; Refill: 1        Counseled patient regarding multimodal approach with healthy nutrition, healthy sleep, regular physical activity, social activities, counseling, and medications. Encouraged to practice lateral sleep position. Avoid alcohol and sedatives close to bedtime.    Refill supplies x 1 year.  Provigil 100 mg every morning #30 with 1 refill I will see patient back in 4 weeks or sooner as symptoms warrant.    Rosangela Cruz, APRN    April 4, 2024      CC: Chintan Toney MD         No ref. provider found

## 2024-04-12 ENCOUNTER — TELEPHONE (OUTPATIENT)
Dept: SLEEP MEDICINE | Facility: CLINIC | Age: 64
End: 2024-04-12
Payer: COMMERCIAL

## 2024-04-12 NOTE — TELEPHONE ENCOUNTER
PT CALLED STATING MARCIA STARTED HER ON A NEW MEDICATION AND SHE HASN'T BEEN ABLE TO RECEIVE FROM HER PHARMACY. SHE USES [facility].

## 2024-04-23 DIAGNOSIS — G47.10 HYPERSOMNIA WITH SLEEP APNEA: ICD-10-CM

## 2024-04-23 DIAGNOSIS — G47.33 OSA (OBSTRUCTIVE SLEEP APNEA): ICD-10-CM

## 2024-04-23 DIAGNOSIS — G47.30 HYPERSOMNIA WITH SLEEP APNEA: ICD-10-CM

## 2024-04-23 RX ORDER — MODAFINIL 100 MG/1
100 TABLET ORAL DAILY
Qty: 30 TABLET | Refills: 2 | Status: SHIPPED | OUTPATIENT
Start: 2024-04-23